# Patient Record
Sex: FEMALE | Race: WHITE | NOT HISPANIC OR LATINO | Employment: FULL TIME | URBAN - METROPOLITAN AREA
[De-identification: names, ages, dates, MRNs, and addresses within clinical notes are randomized per-mention and may not be internally consistent; named-entity substitution may affect disease eponyms.]

---

## 2017-03-14 ENCOUNTER — OFFICE VISIT (OUTPATIENT)
Dept: NEUROSURGERY | Facility: CLINIC | Age: 62
End: 2017-03-14

## 2017-03-14 VITALS
RESPIRATION RATE: 16 BRPM | SYSTOLIC BLOOD PRESSURE: 150 MMHG | BODY MASS INDEX: 35.88 KG/M2 | WEIGHT: 195 LBS | HEART RATE: 80 BPM | DIASTOLIC BLOOD PRESSURE: 98 MMHG | HEIGHT: 62 IN

## 2017-03-14 DIAGNOSIS — T84.216A HARDWARE FAILURE OF ANTERIOR COLUMN OF SPINE (HCC): ICD-10-CM

## 2017-03-14 DIAGNOSIS — M20.039: ICD-10-CM

## 2017-03-14 DIAGNOSIS — M43.10 ANTEROLISTHESIS: ICD-10-CM

## 2017-03-14 DIAGNOSIS — M41.20 SCOLIOSIS (AND KYPHOSCOLIOSIS), IDIOPATHIC: ICD-10-CM

## 2017-03-14 DIAGNOSIS — M54.2 NECK PAIN OF OVER 3 MONTHS DURATION: Primary | ICD-10-CM

## 2017-03-14 PROCEDURE — 99214 OFFICE O/P EST MOD 30 MIN: CPT | Performed by: NURSE PRACTITIONER

## 2017-03-14 NOTE — PROGRESS NOTES
Subjective   Patient ID: Alannah Delgado is a 62 y.o. female accompanied today by a friend (her ). She is here for to schedule surgery for hardware issues in her cervical spine. She has had an appt with plastic surgeon, Dr. Wilson.     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  She is anticipating C6 anterior corpectomy and fusion with removal of posterior cervical hardware and extension of cervical fusion from C3 to T2.  Since her last office visit she has had DEXA scan, evaluation with plastic surgeon, Dr. Wilson. She continues with constant neck pain, but no new issues. She denies any numbness, tingling, weakness.     The following portions of the patient's history were reviewed and updated as appropriate: allergies, current medications, past family history, past medical history, past social history, past surgical history and problem list.    Review of Systems   Constitutional: Negative for chills and fatigue.   HENT: Negative for congestion and postnasal drip.    Respiratory: Negative for shortness of breath.    Cardiovascular: Negative for chest pain.   Gastrointestinal: Negative for constipation.   Genitourinary: Negative for difficulty urinating, frequency and urgency.   Musculoskeletal: Positive for neck pain.   Psychiatric/Behavioral: Negative for sleep disturbance.       Objective      DEXA scan dated November 22, 2016 reports osteoporosis with high risk fracture.  T value -3.2 in the lumbar spine and -2.2 in the left femoral neck    Physical Exam   Constitutional: She is oriented to person, place, and time. She appears well-developed and well-nourished.   HENT:   Head: Normocephalic and atraumatic.   Mouth/Throat: Abnormal dentition. Dental caries present.   Cardiovascular: Normal rate, regular rhythm, normal heart sounds and intact distal pulses.    Pulmonary/Chest: Effort normal and breath sounds normal.   Musculoskeletal:         Cervical back: She exhibits decreased range of motion, tenderness, bony tenderness, deformity (visible closed skin but protruding posterior cervical hardware) and spasm.   Neurological: She is alert and oriented to person, place, and time. She has normal strength. She has a normal Romberg Test. Gait normal.   Reflex Scores:       Tricep reflexes are 3+ on the right side and 2+ on the left side.       Bicep reflexes are 2+ on the right side and 2+ on the left side.       Brachioradialis reflexes are 2+ on the right side and 2+ on the left side.       Patellar reflexes are 2+ on the right side and 2+ on the left side.  Skin: Skin is warm and dry.   Psychiatric: She has a normal mood and affect. Her behavior is normal. Judgment and thought content normal.   Vitals reviewed.    Neurologic Exam     Mental Status   Oriented to person, place, and time.   Level of consciousness: alert    Motor Exam   Muscle bulk: normal  Overall muscle tone: normal    Strength   Strength 5/5 throughout.     Sensory Exam   Right arm light touch: normal  Left arm light touch: normal  Right arm vibration: normal  Left arm vibration: normal       Temperature intact to cold     Gait, Coordination, and Reflexes     Gait  Gait: normal    Coordination   Romberg: negative    Reflexes   Right brachioradialis: 2+  Left brachioradialis: 2+  Right biceps: 2+  Left biceps: 2+  Right triceps: 3+  Left triceps: 2+  Right patellar: 2+  Left patellar: 2+  Right Temple: absent  Left Temple: absent  Right ankle clonus: absent  Left ankle clonus: absent       Able to heel and toe walk       Assessment/Plan   Independent Review of Radiographic Studies:    None new  Medical Decision Making:      I confirmed and obtained the above history as recorded by the nurse practitioner acting as a scribe. I performed the above examination and it is documented by the nurse practitioner acting as a scribe.    Thankfully we have found a plastic surgeon who will work with us  in order to get adequate skin coverage and tissue coverage of the instrumentation needed.    Surgical intervention will require a C6 anterior corpectomy and anterior instrumentation followed by posterior cervical decompression, removal of hardware, and then replacement of hardware from C2 through T3.  I believe that we need to extend the fusion from that previously recommended because of her osteoporosis.  We will also anticipate placement of a halo which we will leave in place for at least 6 weeks.    He has some evidence of dental caries and will require dental work prior to surgical intervention.    The risks, benefits, and alternatives of anterior cervical discectomy with allograft fusion and anterior instrumentation were explained in detail to the patient. The alternative is not to have the operation. The benefit should be, though is not guaranteed, primarily a potential reduction in the neurosensory and/or motor dysfunction; secondarily a possible reduction in neck pain. The risks include, but are not limited to, the possibility of death, infection, bleeding, paralysis, problems with the approach to the cervical spine such as stretching or cutting the laryngeal nerve resulting in vocal chord paralysis, hoarseness, blindness; damage to the carotid artery resulting in stroke; damage to the esophagus resulting in problems with swallowing or painful swallowing; incontinence of urine or stool, sexual dysfunction; meningitis; lack of bony fusion requiring further surgical intervention; osteomyelitis; instrumentation breakdown or pullout; spinal instability; no change or worsening of pain. I also explained the realistic expectations as they pertain to the procedure. The patient voiced understanding of these risks, benefits, alternatives, and realistic expectations and requests that we proceed with operative intervention    I explained the risks benefits and alternatives of posterior cervical fusion with auto and  allograft bone and instrumentation.  Risks include but are not limited to the possibility of cervical spinal cord or nerve root damage resulting in paralysis, weakness, numbness, tingling, dysesthesias etc.  The risks of course include the common risks of any surgical intervention which include the risks of anesthesia, infection, bleeding, need for further surgical intervention, epidural hematoma requiring further surgery immediately, risks of positioning including blindness or pressure sores, urinary tract infection, pulmonary embolus, deep venous thrombosis, etc.  The benefit would be though not guaranteed an improvement in the pain syndrome.  The patient voiced understanding of the risks benefits and the alternatives (nonsurgical management), and requests that we proceed with operative intervention.    I also explained the risks of the halo vest and halo itself including infection of the pin sites, the need to replace pins at times, the requirement to wear the halo at all times, the need for pin site treatment at home including daily cleansing of the pin sites with hydrogen peroxide and then placement of Neosporin or some other antibiotic around the pin sites.    After a complete physical exam, the patient has been informed of the consequences, benefits, appropriate us, and office policies regarding the medication being prescribed. A FIDEL check will be made on-line, and will be repeated if prescription is renewed after a 90 day period. The patient agrees to adhering to the medication regimen as prescribed.    The patient has been advised that we will manage post-operative pain for 1 month. If further narcotic medication is needed beyond that period, a referral back to the primary care physician or to a pain management specialist will be made. If the patient cancels or fails to show for scheduled follow-up visits or the pain management referral,  further narcotic prescriptions from this practice may  cease.      Plan:C6 corpectomy and anterior instrumentation.  Posterior cervical decompression and removal of cervical posterior hardware.  Posterior cervical fusion C2 through T3-instrumented with allograft.  Application of halo and halo vest. Plastic surgical coverage of the posterior instrumenation.    Diagnoses and all orders for this visit:    Neck pain of over 3 months duration    C5 on 6 anterolisthesis  -     Case Request; Standing  -     CBC and Differential; Future  -     Basic metabolic panel; Future  -     Sedimentation rate; Future  -     vancomycin (VANCOCIN) 1,250 mg in sodium chloride 0.9 % 250 mL IVPB; Infuse 1,250 mg into a venous catheter 1 (One) Time.  -     Case Request    Steedman-neck deformity, acquired, unspecified laterality  -     Case Request; Standing  -     CBC and Differential; Future  -     Basic metabolic panel; Future  -     Sedimentation rate; Future  -     vancomycin (VANCOCIN) 1,250 mg in sodium chloride 0.9 % 250 mL IVPB; Infuse 1,250 mg into a venous catheter 1 (One) Time.  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  -     Case Request    Other orders  -     Obtain informed consent  -     AMMY hose- To be placed on patient in pre-op; Standing  -     SCD (sequential compression device)- to be placed on patient in Pre-op; Standing  -     Inpatient Admission; Standing  -     Follow Anesthesia Guidelines / Standing Orders; Standing  -     Verify informed consent; Standing  -     Verify NPO Status; Standing  -     Clorhexidine skin prep  -     Provide instructions to patient on NPO status    Return for Recheck 2 weeks after surgery, Follow up with  nurse practitioner.

## 2017-03-15 DIAGNOSIS — M43.10 ANTEROLISTHESIS: Primary | ICD-10-CM

## 2017-03-19 ENCOUNTER — RESULTS ENCOUNTER (OUTPATIENT)
Dept: NEUROSURGERY | Facility: CLINIC | Age: 62
End: 2017-03-19

## 2017-03-19 DIAGNOSIS — M43.10 ANTEROLISTHESIS: ICD-10-CM

## 2017-03-19 DIAGNOSIS — M20.039: ICD-10-CM

## 2017-03-19 DIAGNOSIS — T84.216A HARDWARE FAILURE OF ANTERIOR COLUMN OF SPINE (HCC): ICD-10-CM

## 2017-04-27 ENCOUNTER — APPOINTMENT (OUTPATIENT)
Dept: PREADMISSION TESTING | Facility: HOSPITAL | Age: 62
End: 2017-04-27

## 2017-04-27 ENCOUNTER — OFFICE VISIT (OUTPATIENT)
Dept: NEUROSURGERY | Facility: CLINIC | Age: 62
End: 2017-04-27

## 2017-04-27 VITALS
SYSTOLIC BLOOD PRESSURE: 133 MMHG | HEART RATE: 89 BPM | DIASTOLIC BLOOD PRESSURE: 80 MMHG | HEIGHT: 63 IN | WEIGHT: 190 LBS | BODY MASS INDEX: 33.66 KG/M2 | OXYGEN SATURATION: 100 % | TEMPERATURE: 96.8 F | RESPIRATION RATE: 16 BRPM

## 2017-04-27 VITALS
DIASTOLIC BLOOD PRESSURE: 92 MMHG | HEART RATE: 72 BPM | RESPIRATION RATE: 16 BRPM | BODY MASS INDEX: 34.38 KG/M2 | SYSTOLIC BLOOD PRESSURE: 140 MMHG | WEIGHT: 191 LBS

## 2017-04-27 DIAGNOSIS — M54.2 NECK PAIN OF OVER 3 MONTHS DURATION: ICD-10-CM

## 2017-04-27 DIAGNOSIS — T84.216A HARDWARE FAILURE OF ANTERIOR COLUMN OF SPINE (HCC): Primary | ICD-10-CM

## 2017-04-27 LAB
ANION GAP SERPL CALCULATED.3IONS-SCNC: 15.6 MMOL/L
BASOPHILS # BLD AUTO: 0.02 10*3/MM3 (ref 0–0.2)
BASOPHILS NFR BLD AUTO: 0.2 % (ref 0–1.5)
BUN BLD-MCNC: 16 MG/DL (ref 8–23)
BUN/CREAT SERPL: 22.2 (ref 7–25)
CALCIUM SPEC-SCNC: 9.6 MG/DL (ref 8.6–10.5)
CHLORIDE SERPL-SCNC: 103 MMOL/L (ref 98–107)
CO2 SERPL-SCNC: 21.4 MMOL/L (ref 22–29)
CREAT BLD-MCNC: 0.72 MG/DL (ref 0.57–1)
DEPRECATED RDW RBC AUTO: 42.7 FL (ref 37–54)
EOSINOPHIL # BLD AUTO: 0.13 10*3/MM3 (ref 0–0.7)
EOSINOPHIL NFR BLD AUTO: 1.5 % (ref 0.3–6.2)
ERYTHROCYTE [DISTWIDTH] IN BLOOD BY AUTOMATED COUNT: 12.5 % (ref 11.7–13)
ERYTHROCYTE [SEDIMENTATION RATE] IN BLOOD: 12 MM/HR (ref 0–30)
GFR SERPL CREATININE-BSD FRML MDRD: 82 ML/MIN/1.73
GLUCOSE BLD-MCNC: 101 MG/DL (ref 65–99)
HCT VFR BLD AUTO: 42.7 % (ref 35.6–45.5)
HGB BLD-MCNC: 14.6 G/DL (ref 11.9–15.5)
IMM GRANULOCYTES # BLD: 0.03 10*3/MM3 (ref 0–0.03)
IMM GRANULOCYTES NFR BLD: 0.3 % (ref 0–0.5)
LYMPHOCYTES # BLD AUTO: 1.59 10*3/MM3 (ref 0.9–4.8)
LYMPHOCYTES NFR BLD AUTO: 18.4 % (ref 19.6–45.3)
MCH RBC QN AUTO: 32.6 PG (ref 26.9–32)
MCHC RBC AUTO-ENTMCNC: 34.2 G/DL (ref 32.4–36.3)
MCV RBC AUTO: 95.3 FL (ref 80.5–98.2)
MONOCYTES # BLD AUTO: 0.55 10*3/MM3 (ref 0.2–1.2)
MONOCYTES NFR BLD AUTO: 6.4 % (ref 5–12)
NEUTROPHILS # BLD AUTO: 6.3 10*3/MM3 (ref 1.9–8.1)
NEUTROPHILS NFR BLD AUTO: 73.2 % (ref 42.7–76)
PLATELET # BLD AUTO: 287 10*3/MM3 (ref 140–500)
PMV BLD AUTO: 10.6 FL (ref 6–12)
POTASSIUM BLD-SCNC: 4 MMOL/L (ref 3.5–5.2)
RBC # BLD AUTO: 4.48 10*6/MM3 (ref 3.9–5.2)
SODIUM BLD-SCNC: 140 MMOL/L (ref 136–145)
WBC NRBC COR # BLD: 8.62 10*3/MM3 (ref 4.5–10.7)

## 2017-04-27 PROCEDURE — 85652 RBC SED RATE AUTOMATED: CPT | Performed by: NEUROLOGICAL SURGERY

## 2017-04-27 PROCEDURE — 99213 OFFICE O/P EST LOW 20 MIN: CPT | Performed by: NURSE PRACTITIONER

## 2017-04-27 PROCEDURE — 93010 ELECTROCARDIOGRAM REPORT: CPT | Performed by: INTERNAL MEDICINE

## 2017-04-27 PROCEDURE — 36415 COLL VENOUS BLD VENIPUNCTURE: CPT | Performed by: NEUROLOGICAL SURGERY

## 2017-04-27 PROCEDURE — 85025 COMPLETE CBC W/AUTO DIFF WBC: CPT | Performed by: NEUROLOGICAL SURGERY

## 2017-04-27 PROCEDURE — 80048 BASIC METABOLIC PNL TOTAL CA: CPT | Performed by: NEUROLOGICAL SURGERY

## 2017-04-27 PROCEDURE — 93005 ELECTROCARDIOGRAM TRACING: CPT

## 2017-04-27 NOTE — PROGRESS NOTES
Subjective   Patient ID: Alannah Delgado is a 62 y.o. female is here today for follow-up prior to removal of cervical surgical hardware. She is accompanied by a friend, Rachell    History of Present Illness   Patient resents for ongoing follow-up of cervical hardware failure.  She is anticipating surgery with C6 corpectomy and anterior instrumentation. Posterior cervical decompression and removal of cervical posterior hardware. Posterior cervical fusion C2 through T3-instrumented with allograft. Application of halo and halo vest. Plastic surgical coverage of the posterior instrumenation. She continues with neck pain. She has no arm pain, numbness, or weakness.     The following portions of the patient's history were reviewed and updated as appropriate: allergies, current medications, past family history, past medical history, past social history, past surgical history and problem list.    Review of Systems   Constitutional: Negative for fever.   HENT: Negative for trouble swallowing.    Respiratory: Negative for cough and wheezing.    Cardiovascular: Negative for chest pain.   Gastrointestinal: Negative for abdominal pain and constipation.   Genitourinary: Negative for difficulty urinating and enuresis.   Musculoskeletal: Positive for neck pain. Negative for gait problem.   Neurological: Negative for weakness and numbness.       Objective      Results from last 7 days  Lab Units 04/27/17  0904   WBC 10*3/mm3 8.62   HEMOGLOBIN g/dL 14.6   HEMATOCRIT % 42.7   PLATELETS 10*3/mm3 287       Results from last 7 days  Lab Units 04/27/17  0904   SODIUM mmol/L 140   POTASSIUM mmol/L 4.0   CHLORIDE mmol/L 103   TOTAL CO2 mmol/L 21.4*   BUN mg/dL 16   CREATININE mg/dL 0.72   CALCIUM mg/dL 9.6   GLUCOSE mg/dL 101*         Results from last 7 days  Lab Units 04/27/17  0937   SED RATE mm/hr 12     EKG- SR  Physical Exam   Constitutional: She is oriented to person, place, and time. She appears well-developed and well-nourished. She  is cooperative.   HENT:   Head: Normocephalic and atraumatic.   Eyes: No scleral icterus.   Neck: Neck supple.   Cardiovascular: Normal rate, regular rhythm and intact distal pulses.    No murmur heard.  Pulmonary/Chest: Effort normal and breath sounds normal.   Abdominal: Soft. Bowel sounds are normal. There is no tenderness.   Musculoskeletal:        Cervical back: She exhibits decreased range of motion, tenderness, bony tenderness and pain (with rotation).   Neurological: She is alert and oriented to person, place, and time. She has normal strength. She displays no atrophy. She exhibits normal muscle tone. She displays a negative Romberg sign. Coordination and gait normal. GCS eye subscore is 4. GCS verbal subscore is 5. GCS motor subscore is 6.   Reflex Scores:       Tricep reflexes are 3+ on the right side and 3+ on the left side.       Bicep reflexes are 3+ on the right side and 3+ on the left side.       Brachioradialis reflexes are 3+ on the right side and 3+ on the left side.       Patellar reflexes are 2+ on the right side and 2+ on the left side.  Negative Temple    Skin: Skin is warm, dry and intact.   Well healed posterior cervical incision. Palpable hardware but no skin breakdown.   Psychiatric: She has a normal mood and affect. Her speech is normal and behavior is normal. Judgment and thought content normal. Cognition and memory are normal.   Vitals reviewed.    Neurologic Exam     Mental Status   Oriented to person, place, and time.   Speech: speech is normal     Motor Exam     Strength   Strength 5/5 throughout.     Sensory Exam   Light touch normal.     Gait, Coordination, and Reflexes     Reflexes   Right brachioradialis: 3+  Left brachioradialis: 3+  Right biceps: 3+  Left biceps: 3+  Right triceps: 3+  Left triceps: 3+  Right patellar: 2+  Left patellar: 2+      Assessment/Plan   Independent Review of Radiographic Studies:    No new imaging    Medical Decision Making:    Patient resents for  follow-up prior to undergoing extensive cervical surgery for hardware failure.  She denies any new problems.  She continues to have significant neck pain.    Her exam is as noted above with no neurologic red flags.  Rbas were discussed in detail regarding the surgery at the last office visit with Dr. Guidry.  I answered multiple questions today with regards to postoperative expectations including rehabilitation and restrictions with a halo brace.    Plan: Cervical surgery as planned and return to office following.    Alannah was seen today for cervical hardware failure.    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

## 2017-05-03 ENCOUNTER — ANESTHESIA EVENT (OUTPATIENT)
Dept: PERIOP | Facility: HOSPITAL | Age: 62
End: 2017-05-03

## 2017-05-03 ENCOUNTER — APPOINTMENT (OUTPATIENT)
Dept: GENERAL RADIOLOGY | Facility: HOSPITAL | Age: 62
End: 2017-05-03

## 2017-05-03 ENCOUNTER — ANESTHESIA (OUTPATIENT)
Dept: PERIOP | Facility: HOSPITAL | Age: 62
End: 2017-05-03

## 2017-05-03 ENCOUNTER — HOSPITAL ENCOUNTER (INPATIENT)
Facility: HOSPITAL | Age: 62
LOS: 7 days | End: 2017-05-10
Attending: NEUROLOGICAL SURGERY | Admitting: NEUROLOGICAL SURGERY

## 2017-05-03 DIAGNOSIS — M20.039: ICD-10-CM

## 2017-05-03 DIAGNOSIS — M43.10 ANTEROLISTHESIS: ICD-10-CM

## 2017-05-03 DIAGNOSIS — R26.2 DIFFICULTY WALKING: ICD-10-CM

## 2017-05-03 DIAGNOSIS — M54.2 NECK PAIN OF OVER 3 MONTHS DURATION: Primary | ICD-10-CM

## 2017-05-03 DIAGNOSIS — T84.216A HARDWARE FAILURE OF ANTERIOR COLUMN OF SPINE (HCC): ICD-10-CM

## 2017-05-03 LAB — GLUCOSE BLDC GLUCOMTR-MCNC: 188 MG/DL (ref 70–130)

## 2017-05-03 PROCEDURE — C1713 ANCHOR/SCREW BN/BN,TIS/BN: HCPCS | Performed by: NEUROLOGICAL SURGERY

## 2017-05-03 PROCEDURE — 25010000002 HYDROMORPHONE PER 4 MG: Performed by: NURSE ANESTHETIST, CERTIFIED REGISTERED

## 2017-05-03 PROCEDURE — 20936 SP BONE AGRFT LOCAL ADD-ON: CPT | Performed by: NEUROLOGICAL SURGERY

## 2017-05-03 PROCEDURE — 22554 ARTHRD ANT NTRBD MIN DSC CRV: CPT | Performed by: NEUROLOGICAL SURGERY

## 2017-05-03 PROCEDURE — 25010000002 FENTANYL CITRATE (PF) 100 MCG/2ML SOLUTION: Performed by: NURSE ANESTHETIST, CERTIFIED REGISTERED

## 2017-05-03 PROCEDURE — 25010000002 MIDAZOLAM PER 1 MG: Performed by: ANESTHESIOLOGY

## 2017-05-03 PROCEDURE — 25010000002 PHENYLEPHRINE PER 1 ML: Performed by: NURSE ANESTHETIST, CERTIFIED REGISTERED

## 2017-05-03 PROCEDURE — 22846 INSERT SPINE FIXATION DEVICE: CPT | Performed by: NEUROLOGICAL SURGERY

## 2017-05-03 PROCEDURE — 22585 ARTHRD ANT NTRBD MIN DSC EA: CPT | Performed by: SPECIALIST/TECHNOLOGIST, OTHER

## 2017-05-03 PROCEDURE — 85014 HEMATOCRIT: CPT

## 2017-05-03 PROCEDURE — 22846 INSERT SPINE FIXATION DEVICE: CPT | Performed by: SPECIALIST/TECHNOLOGIST, OTHER

## 2017-05-03 PROCEDURE — 82947 ASSAY GLUCOSE BLOOD QUANT: CPT

## 2017-05-03 PROCEDURE — 22600 ARTHRD PST TQ 1NTRSPC CRV: CPT | Performed by: SPECIALIST/TECHNOLOGIST, OTHER

## 2017-05-03 PROCEDURE — 22843 INSERT SPINE FIXATION DEVICE: CPT | Performed by: SPECIALIST/TECHNOLOGIST, OTHER

## 2017-05-03 PROCEDURE — 20661 APPLICATION HALO CRANIAL: CPT | Performed by: NEUROLOGICAL SURGERY

## 2017-05-03 PROCEDURE — 25010000002 DEXAMETHASONE PER 1 MG: Performed by: NURSE ANESTHETIST, CERTIFIED REGISTERED

## 2017-05-03 PROCEDURE — 2W60X0Z TRACTION OF HEAD USING TRACTION APPARATUS: ICD-10-PCS | Performed by: NEUROLOGICAL SURGERY

## 2017-05-03 PROCEDURE — 72050 X-RAY EXAM NECK SPINE 4/5VWS: CPT

## 2017-05-03 PROCEDURE — 22585 ARTHRD ANT NTRBD MIN DSC EA: CPT | Performed by: NEUROLOGICAL SURGERY

## 2017-05-03 PROCEDURE — 25010000002 VANCOMYCIN PER 500 MG: Performed by: NEUROLOGICAL SURGERY

## 2017-05-03 PROCEDURE — 22554 ARTHRD ANT NTRBD MIN DSC CRV: CPT | Performed by: SPECIALIST/TECHNOLOGIST, OTHER

## 2017-05-03 PROCEDURE — 63081 REMOVE VERT BODY DCMPRN CRVL: CPT | Performed by: SPECIALIST/TECHNOLOGIST, OTHER

## 2017-05-03 PROCEDURE — 85018 HEMOGLOBIN: CPT

## 2017-05-03 PROCEDURE — 22614 ARTHRD PST TQ 1NTRSPC EA ADD: CPT | Performed by: SPECIALIST/TECHNOLOGIST, OTHER

## 2017-05-03 PROCEDURE — 25010000002 FENTANYL CITRATE (PF) 100 MCG/2ML SOLUTION: Performed by: ANESTHESIOLOGY

## 2017-05-03 PROCEDURE — 0RG20A0 FUSION OF 2 OR MORE CERVICAL VERTEBRAL JOINTS WITH INTERBODY FUSION DEVICE, ANTERIOR APPROACH, ANTERIOR COLUMN, OPEN APPROACH: ICD-10-PCS | Performed by: NEUROLOGICAL SURGERY

## 2017-05-03 PROCEDURE — 0HX6XZZ TRANSFER BACK SKIN, EXTERNAL APPROACH: ICD-10-PCS | Performed by: SPECIALIST

## 2017-05-03 PROCEDURE — 0RG40Z1: ICD-10-PCS | Performed by: NEUROLOGICAL SURGERY

## 2017-05-03 PROCEDURE — 22843 INSERT SPINE FIXATION DEVICE: CPT | Performed by: NEUROLOGICAL SURGERY

## 2017-05-03 PROCEDURE — 76000 FLUOROSCOPY <1 HR PHYS/QHP: CPT

## 2017-05-03 PROCEDURE — 25010000002 VANCOMYCIN: Performed by: NEUROLOGICAL SURGERY

## 2017-05-03 PROCEDURE — 25010000002 MIDAZOLAM PER 1 MG: Performed by: NURSE ANESTHETIST, CERTIFIED REGISTERED

## 2017-05-03 PROCEDURE — 25010000002 PROMETHAZINE PER 50 MG: Performed by: NURSE ANESTHETIST, CERTIFIED REGISTERED

## 2017-05-03 PROCEDURE — L0174 CERV SR 2PC THOR EXT PRE OTS: HCPCS | Performed by: NEUROLOGICAL SURGERY

## 2017-05-03 PROCEDURE — 63081 REMOVE VERT BODY DCMPRN CRVL: CPT | Performed by: NEUROLOGICAL SURGERY

## 2017-05-03 PROCEDURE — 22614 ARTHRD PST TQ 1NTRSPC EA ADD: CPT | Performed by: NEUROLOGICAL SURGERY

## 2017-05-03 PROCEDURE — 82962 GLUCOSE BLOOD TEST: CPT

## 2017-05-03 PROCEDURE — 82803 BLOOD GASES ANY COMBINATION: CPT

## 2017-05-03 PROCEDURE — 22854 INSJ BIOMECHANICAL DEVICE: CPT | Performed by: SPECIALIST/TECHNOLOGIST, OTHER

## 2017-05-03 PROCEDURE — 22854 INSJ BIOMECHANICAL DEVICE: CPT | Performed by: NEUROLOGICAL SURGERY

## 2017-05-03 PROCEDURE — 25010000002 ONDANSETRON PER 1 MG: Performed by: NURSE ANESTHETIST, CERTIFIED REGISTERED

## 2017-05-03 PROCEDURE — 22600 ARTHRD PST TQ 1NTRSPC CRV: CPT | Performed by: NEUROLOGICAL SURGERY

## 2017-05-03 PROCEDURE — 25010000002 PROMETHAZINE PER 50 MG

## 2017-05-03 PROCEDURE — 0PP304Z REMOVAL OF INTERNAL FIXATION DEVICE FROM CERVICAL VERTEBRA, OPEN APPROACH: ICD-10-PCS | Performed by: NEUROLOGICAL SURGERY

## 2017-05-03 PROCEDURE — 25010000002 PROPOFOL 10 MG/ML EMULSION: Performed by: NURSE ANESTHETIST, CERTIFIED REGISTERED

## 2017-05-03 PROCEDURE — 25010000002 SUCCINYLCHOLINE PER 20 MG: Performed by: NURSE ANESTHETIST, CERTIFIED REGISTERED

## 2017-05-03 DEVICE — ROD 7750015 3.5MM ROD 240MM
Type: IMPLANTABLE DEVICE | Status: FUNCTIONAL
Brand: VERTEX® RECONSTRUCTION SYSTEM

## 2017-05-03 DEVICE — SCREW 6958726 3.5 X 26MM MAS
Type: IMPLANTABLE DEVICE | Status: FUNCTIONAL
Brand: VERTEX® RECONSTRUCTION SYSTEM

## 2017-05-03 DEVICE — SCREW 3120515 4.0 X 15 SELF DRILL VAR
Type: IMPLANTABLE DEVICE | Site: SPINE CERVICAL | Status: FUNCTIONAL
Brand: ATLANTIS® ANTERIOR CERVICAL PLATE SYSTEM

## 2017-05-03 DEVICE — IMPLANT 6240864 ANATOMIC 16X14X8MM
Type: IMPLANTABLE DEVICE | Site: SPINE CERVICAL | Status: FUNCTIONAL
Brand: VERTE-STACK® SPINAL SYSTEM

## 2017-05-03 DEVICE — SCREW 6958718 3.5 X 18MM MAS
Type: IMPLANTABLE DEVICE | Status: FUNCTIONAL
Brand: VERTEX® RECONSTRUCTION SYSTEM

## 2017-05-03 DEVICE — SCREW 6958720 3.5 X 20MM MAS
Type: IMPLANTABLE DEVICE | Status: FUNCTIONAL
Brand: VERTEX® RECONSTRUCTION SYSTEM

## 2017-05-03 DEVICE — SCREW 6958930 4.5 X 30MM MAS
Type: IMPLANTABLE DEVICE | Status: FUNCTIONAL
Brand: VERTEX® RECONSTRUCTION SYSTEM

## 2017-05-03 DEVICE — SET SCREW 6950315 M6 SET SCREW
Type: IMPLANTABLE DEVICE | Status: FUNCTIONAL
Brand: VERTEX® RECONSTRUCTION SYSTEM

## 2017-05-03 DEVICE — SCREW 6958724 3.5 X 24MM MAS
Type: IMPLANTABLE DEVICE | Status: FUNCTIONAL
Brand: VERTEX® RECONSTRUCTION SYSTEM

## 2017-05-03 DEVICE — CROSSLINK 7752536 MEDIUM ROD
Type: IMPLANTABLE DEVICE | Status: FUNCTIONAL
Brand: VERTEX® RECONSTRUCTION SYSTEM

## 2017-05-03 DEVICE — STRIP 7800320 MASTERGRAFT STRIP 20CM
Type: IMPLANTABLE DEVICE | Status: FUNCTIONAL
Brand: MASTERGRAFT® STRIP

## 2017-05-03 DEVICE — SCREW 6958934 4.5 X 34MM MAS
Type: IMPLANTABLE DEVICE | Status: FUNCTIONAL
Brand: VERTEX® RECONSTRUCTION SYSTEM

## 2017-05-03 DEVICE — SCREW 6958834 4.0 X 34MM MAS
Type: IMPLANTABLE DEVICE | Status: FUNCTIONAL
Brand: VERTEX® RECONSTRUCTION SYSTEM

## 2017-05-03 DEVICE — LATERAL CONNECTOR 7756064 OPEN 10MM
Type: IMPLANTABLE DEVICE | Status: FUNCTIONAL
Brand: VERTEX® RECONSTRUCTION SYSTEM

## 2017-05-03 RX ORDER — CLINDAMYCIN PHOSPHATE 600 MG/50ML
600 INJECTION INTRAVENOUS EVERY 8 HOURS
Status: COMPLETED | OUTPATIENT
Start: 2017-05-04 | End: 2017-05-04

## 2017-05-03 RX ORDER — NALOXONE HCL 0.4 MG/ML
0.4 VIAL (ML) INJECTION
Status: DISCONTINUED | OUTPATIENT
Start: 2017-05-03 | End: 2017-05-04

## 2017-05-03 RX ORDER — PROMETHAZINE HYDROCHLORIDE 25 MG/ML
INJECTION, SOLUTION INTRAMUSCULAR; INTRAVENOUS
Status: COMPLETED
Start: 2017-05-03 | End: 2017-05-03

## 2017-05-03 RX ORDER — ROCURONIUM BROMIDE 10 MG/ML
INJECTION, SOLUTION INTRAVENOUS AS NEEDED
Status: DISCONTINUED | OUTPATIENT
Start: 2017-05-03 | End: 2017-05-03 | Stop reason: SURG

## 2017-05-03 RX ORDER — NALOXONE HCL 0.4 MG/ML
0.2 VIAL (ML) INJECTION AS NEEDED
Status: DISCONTINUED | OUTPATIENT
Start: 2017-05-03 | End: 2017-05-03 | Stop reason: HOSPADM

## 2017-05-03 RX ORDER — SUCCINYLCHOLINE CHLORIDE 20 MG/ML
INJECTION INTRAMUSCULAR; INTRAVENOUS AS NEEDED
Status: DISCONTINUED | OUTPATIENT
Start: 2017-05-03 | End: 2017-05-03 | Stop reason: SURG

## 2017-05-03 RX ORDER — MIDAZOLAM HYDROCHLORIDE 1 MG/ML
1 INJECTION INTRAMUSCULAR; INTRAVENOUS
Status: DISCONTINUED | OUTPATIENT
Start: 2017-05-03 | End: 2017-05-03 | Stop reason: HOSPADM

## 2017-05-03 RX ORDER — ONDANSETRON 4 MG/1
4 TABLET, FILM COATED ORAL EVERY 6 HOURS PRN
Status: DISCONTINUED | OUTPATIENT
Start: 2017-05-03 | End: 2017-05-10 | Stop reason: HOSPADM

## 2017-05-03 RX ORDER — FENTANYL CITRATE 50 UG/ML
50 INJECTION, SOLUTION INTRAMUSCULAR; INTRAVENOUS
Status: DISCONTINUED | OUTPATIENT
Start: 2017-05-03 | End: 2017-05-03 | Stop reason: HOSPADM

## 2017-05-03 RX ORDER — LABETALOL HYDROCHLORIDE 5 MG/ML
5 INJECTION, SOLUTION INTRAVENOUS
Status: DISCONTINUED | OUTPATIENT
Start: 2017-05-03 | End: 2017-05-03 | Stop reason: HOSPADM

## 2017-05-03 RX ORDER — CLINDAMYCIN PHOSPHATE 900 MG/50ML
INJECTION INTRAVENOUS AS NEEDED
Status: DISCONTINUED | OUTPATIENT
Start: 2017-05-03 | End: 2017-05-03 | Stop reason: SURG

## 2017-05-03 RX ORDER — DOCUSATE SODIUM 100 MG/1
100 CAPSULE, LIQUID FILLED ORAL 2 TIMES DAILY PRN
Status: DISCONTINUED | OUTPATIENT
Start: 2017-05-03 | End: 2017-05-04

## 2017-05-03 RX ORDER — HYDROMORPHONE HYDROCHLORIDE 1 MG/ML
0.5 INJECTION, SOLUTION INTRAMUSCULAR; INTRAVENOUS; SUBCUTANEOUS
Status: DISCONTINUED | OUTPATIENT
Start: 2017-05-03 | End: 2017-05-03 | Stop reason: HOSPADM

## 2017-05-03 RX ORDER — FAMOTIDINE 10 MG/ML
20 INJECTION, SOLUTION INTRAVENOUS ONCE
Status: DISCONTINUED | OUTPATIENT
Start: 2017-05-03 | End: 2017-05-03 | Stop reason: HOSPADM

## 2017-05-03 RX ORDER — SODIUM CHLORIDE 0.9 % (FLUSH) 0.9 %
1-10 SYRINGE (ML) INJECTION AS NEEDED
Status: DISCONTINUED | OUTPATIENT
Start: 2017-05-03 | End: 2017-05-03 | Stop reason: HOSPADM

## 2017-05-03 RX ORDER — SODIUM CHLORIDE, SODIUM LACTATE, POTASSIUM CHLORIDE, CALCIUM CHLORIDE 600; 310; 30; 20 MG/100ML; MG/100ML; MG/100ML; MG/100ML
75 INJECTION, SOLUTION INTRAVENOUS CONTINUOUS
Status: DISCONTINUED | OUTPATIENT
Start: 2017-05-03 | End: 2017-05-06

## 2017-05-03 RX ORDER — MORPHINE SULFATE 10 MG/ML
6 INJECTION INTRAMUSCULAR; INTRAVENOUS; SUBCUTANEOUS
Status: DISCONTINUED | OUTPATIENT
Start: 2017-05-03 | End: 2017-05-04

## 2017-05-03 RX ORDER — OXYCODONE AND ACETAMINOPHEN 7.5; 325 MG/1; MG/1
1 TABLET ORAL ONCE AS NEEDED
Status: DISCONTINUED | OUTPATIENT
Start: 2017-05-03 | End: 2017-05-03 | Stop reason: HOSPADM

## 2017-05-03 RX ORDER — ONDANSETRON 4 MG/1
4 TABLET, ORALLY DISINTEGRATING ORAL EVERY 6 HOURS PRN
Status: DISCONTINUED | OUTPATIENT
Start: 2017-05-03 | End: 2017-05-10 | Stop reason: HOSPADM

## 2017-05-03 RX ORDER — NALOXONE HCL 0.4 MG/ML
0.1 VIAL (ML) INJECTION
Status: DISCONTINUED | OUTPATIENT
Start: 2017-05-03 | End: 2017-05-03

## 2017-05-03 RX ORDER — DIPHENHYDRAMINE HYDROCHLORIDE 50 MG/ML
12.5 INJECTION INTRAMUSCULAR; INTRAVENOUS
Status: DISCONTINUED | OUTPATIENT
Start: 2017-05-03 | End: 2017-05-03 | Stop reason: HOSPADM

## 2017-05-03 RX ORDER — SODIUM CHLORIDE, SODIUM LACTATE, POTASSIUM CHLORIDE, CALCIUM CHLORIDE 600; 310; 30; 20 MG/100ML; MG/100ML; MG/100ML; MG/100ML
INJECTION, SOLUTION INTRAVENOUS CONTINUOUS PRN
Status: DISCONTINUED | OUTPATIENT
Start: 2017-05-03 | End: 2017-05-03 | Stop reason: SURG

## 2017-05-03 RX ORDER — HYDROCODONE BITARTRATE AND ACETAMINOPHEN 7.5; 325 MG/1; MG/1
1 TABLET ORAL ONCE AS NEEDED
Status: DISCONTINUED | OUTPATIENT
Start: 2017-05-03 | End: 2017-05-03 | Stop reason: HOSPADM

## 2017-05-03 RX ORDER — PROMETHAZINE HYDROCHLORIDE 25 MG/ML
12.5 INJECTION, SOLUTION INTRAMUSCULAR; INTRAVENOUS ONCE AS NEEDED
Status: COMPLETED | OUTPATIENT
Start: 2017-05-03 | End: 2017-05-03

## 2017-05-03 RX ORDER — ACETAMINOPHEN 325 MG/1
650 TABLET ORAL EVERY 4 HOURS PRN
Status: DISCONTINUED | OUTPATIENT
Start: 2017-05-03 | End: 2017-05-10 | Stop reason: HOSPADM

## 2017-05-03 RX ORDER — LIDOCAINE HYDROCHLORIDE 20 MG/ML
INJECTION, SOLUTION INFILTRATION; PERINEURAL AS NEEDED
Status: DISCONTINUED | OUTPATIENT
Start: 2017-05-03 | End: 2017-05-03 | Stop reason: SURG

## 2017-05-03 RX ORDER — PROMETHAZINE HYDROCHLORIDE 25 MG/1
25 TABLET ORAL ONCE AS NEEDED
Status: COMPLETED | OUTPATIENT
Start: 2017-05-03 | End: 2017-05-03

## 2017-05-03 RX ORDER — HYDROMORPHONE HCL 110MG/55ML
PATIENT CONTROLLED ANALGESIA SYRINGE INTRAVENOUS AS NEEDED
Status: DISCONTINUED | OUTPATIENT
Start: 2017-05-03 | End: 2017-05-03 | Stop reason: SURG

## 2017-05-03 RX ORDER — PROMETHAZINE HYDROCHLORIDE 25 MG/1
25 SUPPOSITORY RECTAL ONCE AS NEEDED
Status: COMPLETED | OUTPATIENT
Start: 2017-05-03 | End: 2017-05-03

## 2017-05-03 RX ORDER — PROMETHAZINE HYDROCHLORIDE 25 MG/1
12.5 TABLET ORAL ONCE AS NEEDED
Status: DISCONTINUED | OUTPATIENT
Start: 2017-05-03 | End: 2017-05-03 | Stop reason: HOSPADM

## 2017-05-03 RX ORDER — ONDANSETRON 2 MG/ML
4 INJECTION INTRAMUSCULAR; INTRAVENOUS ONCE AS NEEDED
Status: COMPLETED | OUTPATIENT
Start: 2017-05-03 | End: 2017-05-03

## 2017-05-03 RX ORDER — HYDRALAZINE HYDROCHLORIDE 20 MG/ML
5 INJECTION INTRAMUSCULAR; INTRAVENOUS
Status: DISCONTINUED | OUTPATIENT
Start: 2017-05-03 | End: 2017-05-03 | Stop reason: HOSPADM

## 2017-05-03 RX ORDER — SODIUM CHLORIDE, SODIUM LACTATE, POTASSIUM CHLORIDE, CALCIUM CHLORIDE 600; 310; 30; 20 MG/100ML; MG/100ML; MG/100ML; MG/100ML
9 INJECTION, SOLUTION INTRAVENOUS CONTINUOUS
Status: DISCONTINUED | OUTPATIENT
Start: 2017-05-03 | End: 2017-05-04

## 2017-05-03 RX ORDER — DIPHENHYDRAMINE HCL 25 MG
25 CAPSULE ORAL EVERY 6 HOURS PRN
Status: CANCELLED | OUTPATIENT
Start: 2017-05-03

## 2017-05-03 RX ORDER — HYDROCODONE BITARTRATE AND ACETAMINOPHEN 5; 325 MG/1; MG/1
2 TABLET ORAL EVERY 4 HOURS PRN
Status: DISCONTINUED | OUTPATIENT
Start: 2017-05-03 | End: 2017-05-04

## 2017-05-03 RX ORDER — HYDROMORPHONE HCL IN 0.9% NACL 10 MG/50ML
PATIENT CONTROLLED ANALGESIA SYRINGE INTRAVENOUS CONTINUOUS
Status: DISCONTINUED | OUTPATIENT
Start: 2017-05-03 | End: 2017-05-04

## 2017-05-03 RX ORDER — LIDOCAINE HYDROCHLORIDE 40 MG/ML
SOLUTION TOPICAL AS NEEDED
Status: DISCONTINUED | OUTPATIENT
Start: 2017-05-03 | End: 2017-05-03 | Stop reason: SURG

## 2017-05-03 RX ORDER — MIDAZOLAM HYDROCHLORIDE 1 MG/ML
2 INJECTION INTRAMUSCULAR; INTRAVENOUS
Status: DISCONTINUED | OUTPATIENT
Start: 2017-05-03 | End: 2017-05-03 | Stop reason: HOSPADM

## 2017-05-03 RX ORDER — ONDANSETRON 2 MG/ML
4 INJECTION INTRAMUSCULAR; INTRAVENOUS EVERY 6 HOURS PRN
Status: DISCONTINUED | OUTPATIENT
Start: 2017-05-03 | End: 2017-05-10 | Stop reason: HOSPADM

## 2017-05-03 RX ORDER — MIDAZOLAM HYDROCHLORIDE 1 MG/ML
INJECTION INTRAMUSCULAR; INTRAVENOUS AS NEEDED
Status: DISCONTINUED | OUTPATIENT
Start: 2017-05-03 | End: 2017-05-03 | Stop reason: SURG

## 2017-05-03 RX ORDER — FLUMAZENIL 0.1 MG/ML
0.2 INJECTION INTRAVENOUS AS NEEDED
Status: DISCONTINUED | OUTPATIENT
Start: 2017-05-03 | End: 2017-05-03 | Stop reason: HOSPADM

## 2017-05-03 RX ORDER — FAMOTIDINE 10 MG/ML
20 INJECTION, SOLUTION INTRAVENOUS ONCE
Status: COMPLETED | OUTPATIENT
Start: 2017-05-03 | End: 2017-05-03

## 2017-05-03 RX ORDER — NALOXONE HCL 0.4 MG/ML
0.1 VIAL (ML) INJECTION
Status: DISCONTINUED | OUTPATIENT
Start: 2017-05-03 | End: 2017-05-04

## 2017-05-03 RX ORDER — SODIUM CHLORIDE, SODIUM LACTATE, POTASSIUM CHLORIDE, CALCIUM CHLORIDE 600; 310; 30; 20 MG/100ML; MG/100ML; MG/100ML; MG/100ML
9 INJECTION, SOLUTION INTRAVENOUS CONTINUOUS
Status: DISCONTINUED | OUTPATIENT
Start: 2017-05-03 | End: 2017-05-03

## 2017-05-03 RX ORDER — BISACODYL 5 MG/1
10 TABLET, DELAYED RELEASE ORAL DAILY PRN
Status: DISCONTINUED | OUTPATIENT
Start: 2017-05-03 | End: 2017-05-10 | Stop reason: HOSPADM

## 2017-05-03 RX ORDER — DIPHENHYDRAMINE HYDROCHLORIDE 50 MG/ML
25 INJECTION INTRAMUSCULAR; INTRAVENOUS EVERY 6 HOURS PRN
Status: DISCONTINUED | OUTPATIENT
Start: 2017-05-03 | End: 2017-05-10 | Stop reason: HOSPADM

## 2017-05-03 RX ORDER — DEXAMETHASONE SODIUM PHOSPHATE 10 MG/ML
INJECTION INTRAMUSCULAR; INTRAVENOUS AS NEEDED
Status: DISCONTINUED | OUTPATIENT
Start: 2017-05-03 | End: 2017-05-03 | Stop reason: SURG

## 2017-05-03 RX ORDER — MORPHINE SULFATE IN 0.9 % NACL 50 MG/50ML
PATIENT CONTROLLED ANALGESIA SYRINGE INTRAVENOUS CONTINUOUS
Status: DISCONTINUED | OUTPATIENT
Start: 2017-05-03 | End: 2017-05-03

## 2017-05-03 RX ORDER — PROPOFOL 10 MG/ML
VIAL (ML) INTRAVENOUS AS NEEDED
Status: DISCONTINUED | OUTPATIENT
Start: 2017-05-03 | End: 2017-05-03 | Stop reason: SURG

## 2017-05-03 RX ORDER — FENTANYL CITRATE 50 UG/ML
INJECTION, SOLUTION INTRAMUSCULAR; INTRAVENOUS AS NEEDED
Status: DISCONTINUED | OUTPATIENT
Start: 2017-05-03 | End: 2017-05-03 | Stop reason: SURG

## 2017-05-03 RX ADMIN — DEXAMETHASONE SODIUM PHOSPHATE 8 MG: 10 INJECTION INTRAMUSCULAR; INTRAVENOUS at 09:09

## 2017-05-03 RX ADMIN — SODIUM CHLORIDE, POTASSIUM CHLORIDE, SODIUM LACTATE AND CALCIUM CHLORIDE: 600; 310; 30; 20 INJECTION, SOLUTION INTRAVENOUS at 10:02

## 2017-05-03 RX ADMIN — HYDROMORPHONE HYDROCHLORIDE 0.5 MG: 1 INJECTION, SOLUTION INTRAMUSCULAR; INTRAVENOUS; SUBCUTANEOUS at 19:34

## 2017-05-03 RX ADMIN — PHENYLEPHRINE HYDROCHLORIDE 100 MCG: 10 INJECTION INTRAVENOUS at 08:41

## 2017-05-03 RX ADMIN — PHENYLEPHRINE HYDROCHLORIDE 100 MCG: 10 INJECTION INTRAVENOUS at 13:00

## 2017-05-03 RX ADMIN — PHENYLEPHRINE HYDROCHLORIDE 100 MCG: 10 INJECTION INTRAVENOUS at 11:23

## 2017-05-03 RX ADMIN — FENTANYL CITRATE 50 MCG: 50 INJECTION INTRAMUSCULAR; INTRAVENOUS at 10:19

## 2017-05-03 RX ADMIN — FENTANYL CITRATE 50 MCG: 50 INJECTION INTRAMUSCULAR; INTRAVENOUS at 07:45

## 2017-05-03 RX ADMIN — SODIUM CHLORIDE, POTASSIUM CHLORIDE, SODIUM LACTATE AND CALCIUM CHLORIDE: 600; 310; 30; 20 INJECTION, SOLUTION INTRAVENOUS at 08:07

## 2017-05-03 RX ADMIN — PHENYLEPHRINE HYDROCHLORIDE 100 MCG: 10 INJECTION INTRAVENOUS at 08:47

## 2017-05-03 RX ADMIN — FENTANYL CITRATE 50 MCG: 50 INJECTION INTRAMUSCULAR; INTRAVENOUS at 09:17

## 2017-05-03 RX ADMIN — PHENYLEPHRINE HYDROCHLORIDE 100 MCG: 10 INJECTION INTRAVENOUS at 11:41

## 2017-05-03 RX ADMIN — ONDANSETRON 4 MG: 2 INJECTION INTRAMUSCULAR; INTRAVENOUS at 19:07

## 2017-05-03 RX ADMIN — PROMETHAZINE HYDROCHLORIDE 12.5 MG: 25 INJECTION, SOLUTION INTRAMUSCULAR; INTRAVENOUS at 19:45

## 2017-05-03 RX ADMIN — PHENYLEPHRINE HYDROCHLORIDE 100 MCG: 10 INJECTION INTRAVENOUS at 11:16

## 2017-05-03 RX ADMIN — EPHEDRINE SULFATE 10 MG: 50 INJECTION INTRAMUSCULAR; INTRAVENOUS; SUBCUTANEOUS at 11:50

## 2017-05-03 RX ADMIN — HYDROMORPHONE HYDROCHLORIDE 0.5 MG: 2 INJECTION, SOLUTION INTRAMUSCULAR; INTRAVENOUS; SUBCUTANEOUS at 10:23

## 2017-05-03 RX ADMIN — FENTANYL CITRATE 50 MCG: 50 INJECTION INTRAMUSCULAR; INTRAVENOUS at 09:22

## 2017-05-03 RX ADMIN — FENTANYL CITRATE 100 MCG: 50 INJECTION INTRAMUSCULAR; INTRAVENOUS at 09:07

## 2017-05-03 RX ADMIN — SODIUM CHLORIDE, POTASSIUM CHLORIDE, SODIUM LACTATE AND CALCIUM CHLORIDE: 600; 310; 30; 20 INJECTION, SOLUTION INTRAVENOUS at 12:37

## 2017-05-03 RX ADMIN — PHENYLEPHRINE HYDROCHLORIDE 100 MCG: 10 INJECTION INTRAVENOUS at 10:51

## 2017-05-03 RX ADMIN — PHENYLEPHRINE HYDROCHLORIDE 100 MCG: 10 INJECTION INTRAVENOUS at 12:47

## 2017-05-03 RX ADMIN — SUCCINYLCHOLINE CHLORIDE 120 MG: 20 INJECTION, SOLUTION INTRAMUSCULAR; INTRAVENOUS; PARENTERAL at 08:20

## 2017-05-03 RX ADMIN — Medication: at 19:33

## 2017-05-03 RX ADMIN — PHENYLEPHRINE HYDROCHLORIDE 100 MCG: 10 INJECTION INTRAVENOUS at 13:51

## 2017-05-03 RX ADMIN — PROPOFOL 200 MG: 10 INJECTION, EMULSION INTRAVENOUS at 08:20

## 2017-05-03 RX ADMIN — LIDOCAINE HYDROCHLORIDE 1 EACH: 40 SPRAY LARYNGEAL; TRANSTRACHEAL at 08:23

## 2017-05-03 RX ADMIN — HYDROMORPHONE HYDROCHLORIDE 0.5 MG: 2 INJECTION, SOLUTION INTRAMUSCULAR; INTRAVENOUS; SUBCUTANEOUS at 09:26

## 2017-05-03 RX ADMIN — SODIUM CHLORIDE, POTASSIUM CHLORIDE, SODIUM LACTATE AND CALCIUM CHLORIDE 9 ML/HR: 600; 310; 30; 20 INJECTION, SOLUTION INTRAVENOUS at 07:21

## 2017-05-03 RX ADMIN — PHENYLEPHRINE HYDROCHLORIDE 100 MCG: 10 INJECTION INTRAVENOUS at 13:05

## 2017-05-03 RX ADMIN — PHENYLEPHRINE HYDROCHLORIDE 100 MCG: 10 INJECTION INTRAVENOUS at 12:49

## 2017-05-03 RX ADMIN — CLINDAMYCIN PHOSPHATE 900 MG: 18 INJECTION, SOLUTION INTRAVENOUS at 16:13

## 2017-05-03 RX ADMIN — MIDAZOLAM 2 MG: 1 INJECTION INTRAMUSCULAR; INTRAVENOUS at 07:21

## 2017-05-03 RX ADMIN — PHENYLEPHRINE HYDROCHLORIDE 100 MCG: 10 INJECTION INTRAVENOUS at 13:10

## 2017-05-03 RX ADMIN — VANCOMYCIN HYDROCHLORIDE 1250 MG: 1 INJECTION, POWDER, LYOPHILIZED, FOR SOLUTION INTRAVENOUS at 07:21

## 2017-05-03 RX ADMIN — FENTANYL CITRATE 50 MCG: 50 INJECTION INTRAMUSCULAR; INTRAVENOUS at 10:27

## 2017-05-03 RX ADMIN — FENTANYL CITRATE 50 MCG: 50 INJECTION INTRAMUSCULAR; INTRAVENOUS at 08:27

## 2017-05-03 RX ADMIN — EPHEDRINE SULFATE 5 MG: 50 INJECTION INTRAMUSCULAR; INTRAVENOUS; SUBCUTANEOUS at 16:28

## 2017-05-03 RX ADMIN — SODIUM CHLORIDE, POTASSIUM CHLORIDE, SODIUM LACTATE AND CALCIUM CHLORIDE 9 ML/HR: 600; 310; 30; 20 INJECTION, SOLUTION INTRAVENOUS at 07:45

## 2017-05-03 RX ADMIN — SODIUM CHLORIDE, POTASSIUM CHLORIDE, SODIUM LACTATE AND CALCIUM CHLORIDE: 600; 310; 30; 20 INJECTION, SOLUTION INTRAVENOUS at 08:29

## 2017-05-03 RX ADMIN — HYDROMORPHONE HYDROCHLORIDE 0.5 MG: 2 INJECTION, SOLUTION INTRAMUSCULAR; INTRAVENOUS; SUBCUTANEOUS at 12:24

## 2017-05-03 RX ADMIN — PHENYLEPHRINE HYDROCHLORIDE 50 MCG: 10 INJECTION INTRAVENOUS at 16:28

## 2017-05-03 RX ADMIN — MIDAZOLAM HYDROCHLORIDE 2 MG: 1 INJECTION, SOLUTION INTRAMUSCULAR; INTRAVENOUS at 08:12

## 2017-05-03 RX ADMIN — ROCURONIUM BROMIDE 10 MG: 10 INJECTION INTRAVENOUS at 08:20

## 2017-05-03 RX ADMIN — FAMOTIDINE 20 MG: 10 INJECTION, SOLUTION INTRAVENOUS at 07:20

## 2017-05-03 RX ADMIN — PROPOFOL 100 MG: 10 INJECTION, EMULSION INTRAVENOUS at 11:09

## 2017-05-03 RX ADMIN — PHENYLEPHRINE HYDROCHLORIDE 100 MCG: 10 INJECTION INTRAVENOUS at 13:28

## 2017-05-03 RX ADMIN — FENTANYL CITRATE 50 MCG: 50 INJECTION INTRAMUSCULAR; INTRAVENOUS at 10:26

## 2017-05-03 RX ADMIN — EPHEDRINE SULFATE 10 MG: 50 INJECTION INTRAMUSCULAR; INTRAVENOUS; SUBCUTANEOUS at 09:00

## 2017-05-03 RX ADMIN — SODIUM CHLORIDE, POTASSIUM CHLORIDE, SODIUM LACTATE AND CALCIUM CHLORIDE: 600; 310; 30; 20 INJECTION, SOLUTION INTRAVENOUS at 11:49

## 2017-05-03 RX ADMIN — EPHEDRINE SULFATE 10 MG: 50 INJECTION INTRAMUSCULAR; INTRAVENOUS; SUBCUTANEOUS at 08:50

## 2017-05-03 RX ADMIN — HYDROMORPHONE HYDROCHLORIDE 0.5 MG: 1 INJECTION, SOLUTION INTRAMUSCULAR; INTRAVENOUS; SUBCUTANEOUS at 19:55

## 2017-05-03 RX ADMIN — SODIUM CHLORIDE, POTASSIUM CHLORIDE, SODIUM LACTATE AND CALCIUM CHLORIDE: 600; 310; 30; 20 INJECTION, SOLUTION INTRAVENOUS at 13:17

## 2017-05-03 RX ADMIN — MIDAZOLAM 1 MG: 1 INJECTION INTRAMUSCULAR; INTRAVENOUS at 07:44

## 2017-05-03 RX ADMIN — PHENYLEPHRINE HYDROCHLORIDE 100 MCG: 10 INJECTION INTRAVENOUS at 11:35

## 2017-05-03 RX ADMIN — HYDROMORPHONE HYDROCHLORIDE 0.5 MG: 2 INJECTION, SOLUTION INTRAMUSCULAR; INTRAVENOUS; SUBCUTANEOUS at 09:23

## 2017-05-03 RX ADMIN — PROMETHAZINE HYDROCHLORIDE 12.5 MG: 25 INJECTION INTRAMUSCULAR; INTRAVENOUS at 19:45

## 2017-05-03 RX ADMIN — HYDROMORPHONE HYDROCHLORIDE 0.5 MG: 2 INJECTION, SOLUTION INTRAMUSCULAR; INTRAVENOUS; SUBCUTANEOUS at 10:21

## 2017-05-03 RX ADMIN — MIDAZOLAM 1 MG: 1 INJECTION INTRAMUSCULAR; INTRAVENOUS at 07:52

## 2017-05-03 RX ADMIN — LIDOCAINE HYDROCHLORIDE 60 MG: 20 INJECTION, SOLUTION INFILTRATION; PERINEURAL at 08:20

## 2017-05-04 ENCOUNTER — APPOINTMENT (OUTPATIENT)
Dept: GENERAL RADIOLOGY | Facility: HOSPITAL | Age: 62
End: 2017-05-04

## 2017-05-04 LAB
BASE EXCESS BLDA CALC-SCNC: -4 MMOL/L (ref -5–5)
CO2 BLDA-SCNC: 24 MMOL/L (ref 24–29)
GLUCOSE BLDC GLUCOMTR-MCNC: 156 MG/DL (ref 70–130)
HCO3 BLDA-SCNC: 22.5 MMOL/L (ref 22–26)
HCT VFR BLDA CALC: 33 % (ref 38–51)
HGB BLDA-MCNC: 11.2 G/DL (ref 12–17)
PCO2 BLDA: 43.3 MM HG (ref 35–45)
PH BLDA: 7.32 PH UNITS (ref 7.35–7.6)
PO2 BLDA: 173 MMHG (ref 80–105)
POTASSIUM BLDA-SCNC: 3.5 MMOL/L (ref 3.5–4.9)
SAO2 % BLDA: 99 % (ref 95–98)

## 2017-05-04 PROCEDURE — 99024 POSTOP FOLLOW-UP VISIT: CPT | Performed by: NEUROLOGICAL SURGERY

## 2017-05-04 PROCEDURE — 72040 X-RAY EXAM NECK SPINE 2-3 VW: CPT

## 2017-05-04 PROCEDURE — 97161 PT EVAL LOW COMPLEX 20 MIN: CPT

## 2017-05-04 PROCEDURE — 25010000002 DIPHENHYDRAMINE PER 50 MG: Performed by: NEUROLOGICAL SURGERY

## 2017-05-04 PROCEDURE — 25010000002 ONDANSETRON PER 1 MG: Performed by: NEUROLOGICAL SURGERY

## 2017-05-04 RX ORDER — NALOXONE HYDROCHLORIDE 0.4 MG/ML
0.4 INJECTION, SOLUTION INTRAMUSCULAR; INTRAVENOUS; SUBCUTANEOUS AS NEEDED
Status: DISCONTINUED | OUTPATIENT
Start: 2017-05-04 | End: 2017-05-10 | Stop reason: HOSPADM

## 2017-05-04 RX ORDER — HYDROMORPHONE HYDROCHLORIDE 1 MG/ML
0.5 INJECTION, SOLUTION INTRAMUSCULAR; INTRAVENOUS; SUBCUTANEOUS
Status: DISCONTINUED | OUTPATIENT
Start: 2017-05-04 | End: 2017-05-10 | Stop reason: HOSPADM

## 2017-05-04 RX ORDER — SCOLOPAMINE TRANSDERMAL SYSTEM 1 MG/1
1 PATCH, EXTENDED RELEASE TRANSDERMAL
Status: DISCONTINUED | OUTPATIENT
Start: 2017-05-04 | End: 2017-05-10 | Stop reason: HOSPADM

## 2017-05-04 RX ORDER — SENNA AND DOCUSATE SODIUM 50; 8.6 MG/1; MG/1
2 TABLET, FILM COATED ORAL NIGHTLY
Status: DISCONTINUED | OUTPATIENT
Start: 2017-05-04 | End: 2017-05-10 | Stop reason: HOSPADM

## 2017-05-04 RX ORDER — OXYCODONE HYDROCHLORIDE AND ACETAMINOPHEN 5; 325 MG/1; MG/1
2 TABLET ORAL EVERY 6 HOURS PRN
Status: DISCONTINUED | OUTPATIENT
Start: 2017-05-04 | End: 2017-05-10 | Stop reason: HOSPADM

## 2017-05-04 RX ORDER — DOCUSATE SODIUM 100 MG/1
100 CAPSULE, LIQUID FILLED ORAL DAILY
Status: DISCONTINUED | OUTPATIENT
Start: 2017-05-04 | End: 2017-05-10 | Stop reason: HOSPADM

## 2017-05-04 RX ORDER — OXYCODONE HYDROCHLORIDE AND ACETAMINOPHEN 5; 325 MG/1; MG/1
1 TABLET ORAL EVERY 4 HOURS PRN
Status: DISCONTINUED | OUTPATIENT
Start: 2017-05-04 | End: 2017-05-10 | Stop reason: HOSPADM

## 2017-05-04 RX ORDER — METHOCARBAMOL 500 MG/1
500 TABLET, FILM COATED ORAL EVERY 8 HOURS SCHEDULED
Status: DISCONTINUED | OUTPATIENT
Start: 2017-05-04 | End: 2017-05-05

## 2017-05-04 RX ADMIN — DIPHENHYDRAMINE HYDROCHLORIDE 25 MG: 50 INJECTION, SOLUTION INTRAMUSCULAR; INTRAVENOUS at 08:27

## 2017-05-04 RX ADMIN — ONDANSETRON 4 MG: 2 INJECTION INTRAMUSCULAR; INTRAVENOUS at 14:33

## 2017-05-04 RX ADMIN — DOCUSATE SODIUM,SENNOSIDES 2 TABLET: 50; 8.6 TABLET, FILM COATED ORAL at 19:51

## 2017-05-04 RX ADMIN — ONDANSETRON 4 MG: 2 INJECTION INTRAMUSCULAR; INTRAVENOUS at 22:47

## 2017-05-04 RX ADMIN — SCOPOLAMINE 1 PATCH: 1 PATCH, EXTENDED RELEASE TRANSDERMAL at 13:43

## 2017-05-04 RX ADMIN — DOCUSATE SODIUM 100 MG: 100 CAPSULE, LIQUID FILLED ORAL at 13:38

## 2017-05-04 RX ADMIN — OXYCODONE HYDROCHLORIDE AND ACETAMINOPHEN 2 TABLET: 5; 325 TABLET ORAL at 23:22

## 2017-05-04 RX ADMIN — DIPHENHYDRAMINE HYDROCHLORIDE 25 MG: 50 INJECTION, SOLUTION INTRAMUSCULAR; INTRAVENOUS at 19:50

## 2017-05-04 RX ADMIN — SODIUM CHLORIDE, POTASSIUM CHLORIDE, SODIUM LACTATE AND CALCIUM CHLORIDE 75 ML/HR: 600; 310; 30; 20 INJECTION, SOLUTION INTRAVENOUS at 22:14

## 2017-05-04 RX ADMIN — CLINDAMYCIN PHOSPHATE 600 MG: 12 INJECTION, SOLUTION INTRAMUSCULAR; INTRAVENOUS at 08:27

## 2017-05-04 RX ADMIN — OXYCODONE HYDROCHLORIDE AND ACETAMINOPHEN 1 TABLET: 5; 325 TABLET ORAL at 16:52

## 2017-05-04 RX ADMIN — METHOCARBAMOL 500 MG: 500 TABLET ORAL at 21:59

## 2017-05-04 RX ADMIN — SODIUM CHLORIDE, POTASSIUM CHLORIDE, SODIUM LACTATE AND CALCIUM CHLORIDE 75 ML/HR: 600; 310; 30; 20 INJECTION, SOLUTION INTRAVENOUS at 08:26

## 2017-05-04 RX ADMIN — METHOCARBAMOL 500 MG: 500 TABLET ORAL at 13:38

## 2017-05-04 RX ADMIN — ONDANSETRON 4 MG: 2 INJECTION INTRAMUSCULAR; INTRAVENOUS at 03:15

## 2017-05-04 RX ADMIN — CLINDAMYCIN PHOSPHATE 600 MG: 12 INJECTION, SOLUTION INTRAMUSCULAR; INTRAVENOUS at 01:30

## 2017-05-05 PROCEDURE — 99024 POSTOP FOLLOW-UP VISIT: CPT | Performed by: NURSE PRACTITIONER

## 2017-05-05 PROCEDURE — 97110 THERAPEUTIC EXERCISES: CPT

## 2017-05-05 PROCEDURE — 25010000002 HYDROMORPHONE PER 4 MG: Performed by: NURSE PRACTITIONER

## 2017-05-05 PROCEDURE — 25010000002 DEXAMETHASONE PER 1 MG: Performed by: NURSE PRACTITIONER

## 2017-05-05 PROCEDURE — 97530 THERAPEUTIC ACTIVITIES: CPT

## 2017-05-05 PROCEDURE — 97535 SELF CARE MNGMENT TRAINING: CPT

## 2017-05-05 RX ORDER — DEXAMETHASONE SODIUM PHOSPHATE 4 MG/ML
4 INJECTION, SOLUTION INTRA-ARTICULAR; INTRALESIONAL; INTRAMUSCULAR; INTRAVENOUS; SOFT TISSUE EVERY 8 HOURS
Status: COMPLETED | OUTPATIENT
Start: 2017-05-05 | End: 2017-05-06

## 2017-05-05 RX ORDER — DEXAMETHASONE SODIUM PHOSPHATE 4 MG/ML
4 INJECTION, SOLUTION INTRA-ARTICULAR; INTRALESIONAL; INTRAMUSCULAR; INTRAVENOUS; SOFT TISSUE EVERY 8 HOURS
Status: DISCONTINUED | OUTPATIENT
Start: 2017-05-05 | End: 2017-05-05

## 2017-05-05 RX ORDER — METHOCARBAMOL 750 MG/1
750 TABLET, FILM COATED ORAL EVERY 8 HOURS SCHEDULED
Status: DISCONTINUED | OUTPATIENT
Start: 2017-05-05 | End: 2017-05-10 | Stop reason: HOSPADM

## 2017-05-05 RX ADMIN — DOCUSATE SODIUM 100 MG: 100 CAPSULE, LIQUID FILLED ORAL at 09:15

## 2017-05-05 RX ADMIN — DEXAMETHASONE SODIUM PHOSPHATE 4 MG: 4 INJECTION, SOLUTION INTRAMUSCULAR; INTRAVENOUS at 20:40

## 2017-05-05 RX ADMIN — DEXAMETHASONE SODIUM PHOSPHATE 4 MG: 4 INJECTION, SOLUTION INTRAMUSCULAR; INTRAVENOUS at 14:20

## 2017-05-05 RX ADMIN — OXYCODONE HYDROCHLORIDE AND ACETAMINOPHEN 2 TABLET: 5; 325 TABLET ORAL at 20:40

## 2017-05-05 RX ADMIN — METHOCARBAMOL 750 MG: 750 TABLET ORAL at 15:18

## 2017-05-05 RX ADMIN — OXYCODONE HYDROCHLORIDE AND ACETAMINOPHEN 2 TABLET: 5; 325 TABLET ORAL at 11:08

## 2017-05-05 RX ADMIN — HYDROMORPHONE HYDROCHLORIDE 0.5 MG: 1 INJECTION, SOLUTION INTRAMUSCULAR; INTRAVENOUS; SUBCUTANEOUS at 02:39

## 2017-05-05 RX ADMIN — HYDROMORPHONE HYDROCHLORIDE 0.5 MG: 1 INJECTION, SOLUTION INTRAMUSCULAR; INTRAVENOUS; SUBCUTANEOUS at 09:15

## 2017-05-05 RX ADMIN — HYDROMORPHONE HYDROCHLORIDE 0.5 MG: 1 INJECTION, SOLUTION INTRAMUSCULAR; INTRAVENOUS; SUBCUTANEOUS at 14:20

## 2017-05-05 RX ADMIN — METHOCARBAMOL 500 MG: 500 TABLET ORAL at 05:29

## 2017-05-05 RX ADMIN — DOCUSATE SODIUM,SENNOSIDES 2 TABLET: 50; 8.6 TABLET, FILM COATED ORAL at 20:40

## 2017-05-05 RX ADMIN — METHOCARBAMOL 750 MG: 750 TABLET ORAL at 22:00

## 2017-05-06 PROCEDURE — 94799 UNLISTED PULMONARY SVC/PX: CPT

## 2017-05-06 PROCEDURE — 25010000002 DEXAMETHASONE PER 1 MG: Performed by: NURSE PRACTITIONER

## 2017-05-06 PROCEDURE — 99024 POSTOP FOLLOW-UP VISIT: CPT | Performed by: NURSE PRACTITIONER

## 2017-05-06 PROCEDURE — 97110 THERAPEUTIC EXERCISES: CPT

## 2017-05-06 RX ADMIN — METHOCARBAMOL 750 MG: 750 TABLET ORAL at 05:26

## 2017-05-06 RX ADMIN — OXYCODONE HYDROCHLORIDE AND ACETAMINOPHEN 1 TABLET: 5; 325 TABLET ORAL at 08:37

## 2017-05-06 RX ADMIN — METHOCARBAMOL 750 MG: 750 TABLET ORAL at 13:35

## 2017-05-06 RX ADMIN — OXYCODONE HYDROCHLORIDE AND ACETAMINOPHEN 1 TABLET: 5; 325 TABLET ORAL at 17:42

## 2017-05-06 RX ADMIN — DOCUSATE SODIUM 100 MG: 100 CAPSULE, LIQUID FILLED ORAL at 08:36

## 2017-05-06 RX ADMIN — OXYCODONE HYDROCHLORIDE AND ACETAMINOPHEN 1 TABLET: 5; 325 TABLET ORAL at 13:35

## 2017-05-06 RX ADMIN — METHOCARBAMOL 750 MG: 750 TABLET ORAL at 21:23

## 2017-05-06 RX ADMIN — BISACODYL 10 MG: 5 TABLET, COATED ORAL at 21:16

## 2017-05-06 RX ADMIN — DEXAMETHASONE SODIUM PHOSPHATE 4 MG: 4 INJECTION, SOLUTION INTRAMUSCULAR; INTRAVENOUS at 04:57

## 2017-05-07 PROCEDURE — 94799 UNLISTED PULMONARY SVC/PX: CPT

## 2017-05-07 PROCEDURE — 99024 POSTOP FOLLOW-UP VISIT: CPT | Performed by: NEUROLOGICAL SURGERY

## 2017-05-07 PROCEDURE — 97110 THERAPEUTIC EXERCISES: CPT

## 2017-05-07 RX ADMIN — OXYCODONE HYDROCHLORIDE AND ACETAMINOPHEN 1 TABLET: 5; 325 TABLET ORAL at 12:34

## 2017-05-07 RX ADMIN — OXYCODONE HYDROCHLORIDE AND ACETAMINOPHEN 1 TABLET: 5; 325 TABLET ORAL at 01:51

## 2017-05-07 RX ADMIN — OXYCODONE HYDROCHLORIDE AND ACETAMINOPHEN 1 TABLET: 5; 325 TABLET ORAL at 21:20

## 2017-05-07 RX ADMIN — METHOCARBAMOL 750 MG: 750 TABLET ORAL at 21:21

## 2017-05-07 RX ADMIN — DOCUSATE SODIUM,SENNOSIDES 2 TABLET: 50; 8.6 TABLET, FILM COATED ORAL at 21:21

## 2017-05-07 RX ADMIN — DOCUSATE SODIUM 100 MG: 100 CAPSULE, LIQUID FILLED ORAL at 08:56

## 2017-05-07 RX ADMIN — METHOCARBAMOL 750 MG: 750 TABLET ORAL at 13:41

## 2017-05-07 RX ADMIN — METHOCARBAMOL 750 MG: 750 TABLET ORAL at 06:40

## 2017-05-07 RX ADMIN — SCOPOLAMINE 1 PATCH: 1 PATCH, EXTENDED RELEASE TRANSDERMAL at 11:27

## 2017-05-07 RX ADMIN — OXYCODONE HYDROCHLORIDE AND ACETAMINOPHEN 1 TABLET: 5; 325 TABLET ORAL at 16:51

## 2017-05-07 RX ADMIN — OXYCODONE HYDROCHLORIDE AND ACETAMINOPHEN 1 TABLET: 5; 325 TABLET ORAL at 08:57

## 2017-05-08 PROCEDURE — 99024 POSTOP FOLLOW-UP VISIT: CPT | Performed by: NURSE PRACTITIONER

## 2017-05-08 PROCEDURE — 97110 THERAPEUTIC EXERCISES: CPT

## 2017-05-08 PROCEDURE — 97535 SELF CARE MNGMENT TRAINING: CPT

## 2017-05-08 PROCEDURE — 25010000002 ONDANSETRON PER 1 MG: Performed by: NEUROLOGICAL SURGERY

## 2017-05-08 RX ADMIN — OXYCODONE HYDROCHLORIDE AND ACETAMINOPHEN 1 TABLET: 5; 325 TABLET ORAL at 20:53

## 2017-05-08 RX ADMIN — METHOCARBAMOL 750 MG: 750 TABLET ORAL at 07:31

## 2017-05-08 RX ADMIN — OXYCODONE HYDROCHLORIDE AND ACETAMINOPHEN 1 TABLET: 5; 325 TABLET ORAL at 12:17

## 2017-05-08 RX ADMIN — OXYCODONE HYDROCHLORIDE AND ACETAMINOPHEN 1 TABLET: 5; 325 TABLET ORAL at 07:31

## 2017-05-08 RX ADMIN — DOCUSATE SODIUM 100 MG: 100 CAPSULE, LIQUID FILLED ORAL at 09:58

## 2017-05-08 RX ADMIN — METHOCARBAMOL 750 MG: 750 TABLET ORAL at 15:20

## 2017-05-08 RX ADMIN — OXYCODONE HYDROCHLORIDE AND ACETAMINOPHEN 1 TABLET: 5; 325 TABLET ORAL at 16:58

## 2017-05-08 RX ADMIN — METHOCARBAMOL 750 MG: 750 TABLET ORAL at 20:53

## 2017-05-08 RX ADMIN — OXYCODONE HYDROCHLORIDE AND ACETAMINOPHEN 2 TABLET: 5; 325 TABLET ORAL at 01:04

## 2017-05-09 PROCEDURE — 97110 THERAPEUTIC EXERCISES: CPT

## 2017-05-09 PROCEDURE — 99024 POSTOP FOLLOW-UP VISIT: CPT | Performed by: NURSE PRACTITIONER

## 2017-05-09 RX ORDER — LACTULOSE 10 G/15ML
10 SOLUTION ORAL 2 TIMES DAILY
Status: DISCONTINUED | OUTPATIENT
Start: 2017-05-09 | End: 2017-05-09

## 2017-05-09 RX ORDER — POLYETHYLENE GLYCOL 3350 17 G/17G
17 POWDER, FOR SOLUTION ORAL DAILY
Status: DISCONTINUED | OUTPATIENT
Start: 2017-05-09 | End: 2017-05-10 | Stop reason: HOSPADM

## 2017-05-09 RX ADMIN — OXYCODONE HYDROCHLORIDE AND ACETAMINOPHEN 1 TABLET: 5; 325 TABLET ORAL at 17:07

## 2017-05-09 RX ADMIN — METHOCARBAMOL 750 MG: 750 TABLET ORAL at 06:51

## 2017-05-09 RX ADMIN — OXYCODONE HYDROCHLORIDE AND ACETAMINOPHEN 1 TABLET: 5; 325 TABLET ORAL at 10:28

## 2017-05-09 RX ADMIN — DOCUSATE SODIUM 100 MG: 100 CAPSULE, LIQUID FILLED ORAL at 10:25

## 2017-05-09 RX ADMIN — OXYCODONE HYDROCHLORIDE AND ACETAMINOPHEN 1 TABLET: 5; 325 TABLET ORAL at 21:33

## 2017-05-09 RX ADMIN — OXYCODONE HYDROCHLORIDE AND ACETAMINOPHEN 1 TABLET: 5; 325 TABLET ORAL at 06:35

## 2017-05-09 RX ADMIN — OXYCODONE HYDROCHLORIDE AND ACETAMINOPHEN 1 TABLET: 5; 325 TABLET ORAL at 01:23

## 2017-05-09 RX ADMIN — OXYCODONE HYDROCHLORIDE AND ACETAMINOPHEN 1 TABLET: 5; 325 TABLET ORAL at 10:27

## 2017-05-09 RX ADMIN — METHOCARBAMOL 750 MG: 750 TABLET ORAL at 21:33

## 2017-05-09 RX ADMIN — METHOCARBAMOL 750 MG: 750 TABLET ORAL at 17:07

## 2017-05-09 RX ADMIN — DOCUSATE SODIUM,SENNOSIDES 2 TABLET: 50; 8.6 TABLET, FILM COATED ORAL at 21:33

## 2017-05-10 ENCOUNTER — HOSPITAL ENCOUNTER (INPATIENT)
Facility: HOSPITAL | Age: 62
LOS: 13 days | Discharge: HOME HEALTH | End: 2017-05-23
Attending: PHYSICAL MEDICINE & REHABILITATION | Admitting: PHYSICAL MEDICINE & REHABILITATION

## 2017-05-10 VITALS
SYSTOLIC BLOOD PRESSURE: 112 MMHG | WEIGHT: 188.3 LBS | TEMPERATURE: 97.8 F | BODY MASS INDEX: 33.36 KG/M2 | HEART RATE: 97 BPM | DIASTOLIC BLOOD PRESSURE: 85 MMHG | OXYGEN SATURATION: 94 % | HEIGHT: 63 IN | RESPIRATION RATE: 18 BRPM

## 2017-05-10 DIAGNOSIS — T84.216A HARDWARE FAILURE OF ANTERIOR COLUMN OF SPINE (HCC): ICD-10-CM

## 2017-05-10 DIAGNOSIS — Z74.09 IMPAIRED MOBILITY: Primary | ICD-10-CM

## 2017-05-10 DIAGNOSIS — M54.2 NECK PAIN OF OVER 3 MONTHS DURATION: ICD-10-CM

## 2017-05-10 PROCEDURE — 99024 POSTOP FOLLOW-UP VISIT: CPT | Performed by: NURSE PRACTITIONER

## 2017-05-10 PROCEDURE — 25010000002 ONDANSETRON PER 1 MG: Performed by: NEUROLOGICAL SURGERY

## 2017-05-10 PROCEDURE — 97110 THERAPEUTIC EXERCISES: CPT

## 2017-05-10 PROCEDURE — 97535 SELF CARE MNGMENT TRAINING: CPT | Performed by: OCCUPATIONAL THERAPIST

## 2017-05-10 PROCEDURE — 97110 THERAPEUTIC EXERCISES: CPT | Performed by: OCCUPATIONAL THERAPIST

## 2017-05-10 RX ORDER — SENNA AND DOCUSATE SODIUM 50; 8.6 MG/1; MG/1
2 TABLET, FILM COATED ORAL NIGHTLY
Status: DISCONTINUED | OUTPATIENT
Start: 2017-05-10 | End: 2017-05-23

## 2017-05-10 RX ORDER — DIPHENHYDRAMINE HYDROCHLORIDE 50 MG/ML
25 INJECTION INTRAMUSCULAR; INTRAVENOUS EVERY 6 HOURS PRN
Status: CANCELLED | OUTPATIENT
Start: 2017-05-10

## 2017-05-10 RX ORDER — BISACODYL 5 MG/1
10 TABLET, DELAYED RELEASE ORAL DAILY PRN
Status: CANCELLED | OUTPATIENT
Start: 2017-05-10

## 2017-05-10 RX ORDER — NALOXONE HYDROCHLORIDE 0.4 MG/ML
0.4 INJECTION, SOLUTION INTRAMUSCULAR; INTRAVENOUS; SUBCUTANEOUS AS NEEDED
Status: CANCELLED | OUTPATIENT
Start: 2017-05-10

## 2017-05-10 RX ORDER — DOCUSATE SODIUM 100 MG/1
100 CAPSULE, LIQUID FILLED ORAL DAILY
Status: CANCELLED | OUTPATIENT
Start: 2017-05-11

## 2017-05-10 RX ORDER — ONDANSETRON 2 MG/ML
4 INJECTION INTRAMUSCULAR; INTRAVENOUS EVERY 6 HOURS PRN
Status: DISCONTINUED | OUTPATIENT
Start: 2017-05-10 | End: 2017-05-23

## 2017-05-10 RX ORDER — HYDROMORPHONE HYDROCHLORIDE 1 MG/ML
0.5 INJECTION, SOLUTION INTRAMUSCULAR; INTRAVENOUS; SUBCUTANEOUS
Status: DISCONTINUED | OUTPATIENT
Start: 2017-05-10 | End: 2017-05-11

## 2017-05-10 RX ORDER — ONDANSETRON 4 MG/1
4 TABLET, FILM COATED ORAL EVERY 6 HOURS PRN
Status: DISCONTINUED | OUTPATIENT
Start: 2017-05-10 | End: 2017-05-23

## 2017-05-10 RX ORDER — OXYCODONE HYDROCHLORIDE AND ACETAMINOPHEN 5; 325 MG/1; MG/1
2 TABLET ORAL EVERY 6 HOURS PRN
Status: DISCONTINUED | OUTPATIENT
Start: 2017-05-10 | End: 2017-05-11

## 2017-05-10 RX ORDER — OXYCODONE HYDROCHLORIDE AND ACETAMINOPHEN 5; 325 MG/1; MG/1
1 TABLET ORAL EVERY 4 HOURS PRN
Status: CANCELLED | OUTPATIENT
Start: 2017-05-10 | End: 2017-05-14

## 2017-05-10 RX ORDER — ONDANSETRON 4 MG/1
4 TABLET, ORALLY DISINTEGRATING ORAL EVERY 6 HOURS PRN
Status: CANCELLED | OUTPATIENT
Start: 2017-05-10

## 2017-05-10 RX ORDER — ACETAMINOPHEN 325 MG/1
650 TABLET ORAL EVERY 4 HOURS PRN
Status: CANCELLED | OUTPATIENT
Start: 2017-05-10

## 2017-05-10 RX ORDER — OXYCODONE HYDROCHLORIDE AND ACETAMINOPHEN 5; 325 MG/1; MG/1
1 TABLET ORAL EVERY 4 HOURS PRN
Status: DISCONTINUED | OUTPATIENT
Start: 2017-05-10 | End: 2017-05-11

## 2017-05-10 RX ORDER — OXYCODONE HYDROCHLORIDE AND ACETAMINOPHEN 5; 325 MG/1; MG/1
2 TABLET ORAL EVERY 6 HOURS PRN
Status: CANCELLED | OUTPATIENT
Start: 2017-05-10 | End: 2017-05-14

## 2017-05-10 RX ORDER — METHOCARBAMOL 750 MG/1
750 TABLET, FILM COATED ORAL EVERY 8 HOURS SCHEDULED
Status: DISCONTINUED | OUTPATIENT
Start: 2017-05-10 | End: 2017-05-23 | Stop reason: HOSPADM

## 2017-05-10 RX ORDER — DOCUSATE SODIUM 100 MG/1
100 CAPSULE, LIQUID FILLED ORAL DAILY
Status: DISCONTINUED | OUTPATIENT
Start: 2017-05-11 | End: 2017-05-23

## 2017-05-10 RX ORDER — ONDANSETRON 2 MG/ML
4 INJECTION INTRAMUSCULAR; INTRAVENOUS EVERY 6 HOURS PRN
Status: CANCELLED | OUTPATIENT
Start: 2017-05-10

## 2017-05-10 RX ORDER — NALOXONE HYDROCHLORIDE 0.4 MG/ML
0.4 INJECTION, SOLUTION INTRAMUSCULAR; INTRAVENOUS; SUBCUTANEOUS AS NEEDED
Status: DISCONTINUED | OUTPATIENT
Start: 2017-05-10 | End: 2017-05-23

## 2017-05-10 RX ORDER — ACETAMINOPHEN 325 MG/1
650 TABLET ORAL EVERY 4 HOURS PRN
Status: DISCONTINUED | OUTPATIENT
Start: 2017-05-10 | End: 2017-05-23 | Stop reason: HOSPADM

## 2017-05-10 RX ORDER — BISACODYL 5 MG/1
10 TABLET, DELAYED RELEASE ORAL DAILY PRN
Status: DISCONTINUED | OUTPATIENT
Start: 2017-05-10 | End: 2017-05-23

## 2017-05-10 RX ORDER — SENNA AND DOCUSATE SODIUM 50; 8.6 MG/1; MG/1
2 TABLET, FILM COATED ORAL NIGHTLY
Status: CANCELLED | OUTPATIENT
Start: 2017-05-10

## 2017-05-10 RX ORDER — METHOCARBAMOL 750 MG/1
750 TABLET, FILM COATED ORAL EVERY 8 HOURS SCHEDULED
Status: CANCELLED | OUTPATIENT
Start: 2017-05-10

## 2017-05-10 RX ORDER — SCOLOPAMINE TRANSDERMAL SYSTEM 1 MG/1
1 PATCH, EXTENDED RELEASE TRANSDERMAL
Status: DISCONTINUED | OUTPATIENT
Start: 2017-05-13 | End: 2017-05-23

## 2017-05-10 RX ORDER — SCOLOPAMINE TRANSDERMAL SYSTEM 1 MG/1
1 PATCH, EXTENDED RELEASE TRANSDERMAL
Status: CANCELLED | OUTPATIENT
Start: 2017-05-13

## 2017-05-10 RX ORDER — ONDANSETRON 4 MG/1
4 TABLET, FILM COATED ORAL EVERY 6 HOURS PRN
Status: CANCELLED | OUTPATIENT
Start: 2017-05-10

## 2017-05-10 RX ORDER — POLYETHYLENE GLYCOL 3350 17 G/17G
17 POWDER, FOR SOLUTION ORAL DAILY
Status: CANCELLED | OUTPATIENT
Start: 2017-05-11

## 2017-05-10 RX ORDER — DIPHENHYDRAMINE HCL 25 MG
25 CAPSULE ORAL EVERY 6 HOURS PRN
Status: DISCONTINUED | OUTPATIENT
Start: 2017-05-10 | End: 2017-05-23

## 2017-05-10 RX ORDER — POLYETHYLENE GLYCOL 3350 17 G/17G
17 POWDER, FOR SOLUTION ORAL DAILY
Status: DISCONTINUED | OUTPATIENT
Start: 2017-05-11 | End: 2017-05-23

## 2017-05-10 RX ORDER — ONDANSETRON 4 MG/1
4 TABLET, ORALLY DISINTEGRATING ORAL EVERY 6 HOURS PRN
Status: DISCONTINUED | OUTPATIENT
Start: 2017-05-10 | End: 2017-05-23

## 2017-05-10 RX ADMIN — OXYCODONE HYDROCHLORIDE AND ACETAMINOPHEN 2 TABLET: 5; 325 TABLET ORAL at 10:59

## 2017-05-10 RX ADMIN — SCOPOLAMINE 1 PATCH: 1 PATCH, EXTENDED RELEASE TRANSDERMAL at 11:00

## 2017-05-10 RX ADMIN — DOCUSATE SODIUM 100 MG: 100 CAPSULE, LIQUID FILLED ORAL at 08:18

## 2017-05-10 RX ADMIN — DOCUSATE SODIUM,SENNOSIDES 2 TABLET: 50; 8.6 TABLET, FILM COATED ORAL at 21:04

## 2017-05-10 RX ADMIN — ONDANSETRON 4 MG: 2 INJECTION INTRAMUSCULAR; INTRAVENOUS at 08:18

## 2017-05-10 RX ADMIN — POLYETHYLENE GLYCOL 3350 17 G: 17 POWDER, FOR SOLUTION ORAL at 08:18

## 2017-05-10 RX ADMIN — METHOCARBAMOL 750 MG: 750 TABLET ORAL at 21:04

## 2017-05-10 RX ADMIN — OXYCODONE HYDROCHLORIDE AND ACETAMINOPHEN 2 TABLET: 5; 325 TABLET ORAL at 17:25

## 2017-05-10 RX ADMIN — OXYCODONE HYDROCHLORIDE AND ACETAMINOPHEN 1 TABLET: 5; 325 TABLET ORAL at 01:25

## 2017-05-10 RX ADMIN — OXYCODONE HYDROCHLORIDE AND ACETAMINOPHEN 1 TABLET: 5; 325 TABLET ORAL at 23:59

## 2017-05-10 RX ADMIN — OXYCODONE HYDROCHLORIDE AND ACETAMINOPHEN 1 TABLET: 5; 325 TABLET ORAL at 06:52

## 2017-05-10 RX ADMIN — METHOCARBAMOL 750 MG: 750 TABLET ORAL at 06:52

## 2017-05-10 RX ADMIN — METHOCARBAMOL 750 MG: 750 TABLET ORAL at 14:22

## 2017-05-11 PROBLEM — M43.22 CERVICAL VERTEBRAL FUSION: Status: ACTIVE | Noted: 2017-05-11

## 2017-05-11 PROCEDURE — 97165 OT EVAL LOW COMPLEX 30 MIN: CPT

## 2017-05-11 PROCEDURE — 97535 SELF CARE MNGMENT TRAINING: CPT

## 2017-05-11 PROCEDURE — 97162 PT EVAL MOD COMPLEX 30 MIN: CPT

## 2017-05-11 PROCEDURE — 97110 THERAPEUTIC EXERCISES: CPT

## 2017-05-11 RX ORDER — OXYCODONE HYDROCHLORIDE 5 MG/1
5 TABLET ORAL EVERY 4 HOURS PRN
Status: DISCONTINUED | OUTPATIENT
Start: 2017-05-11 | End: 2017-05-23 | Stop reason: HOSPADM

## 2017-05-11 RX ORDER — OXYCODONE HYDROCHLORIDE 5 MG/1
10 TABLET ORAL EVERY 4 HOURS PRN
Status: DISCONTINUED | OUTPATIENT
Start: 2017-05-11 | End: 2017-05-23 | Stop reason: HOSPADM

## 2017-05-11 RX ADMIN — METHOCARBAMOL 750 MG: 750 TABLET ORAL at 21:02

## 2017-05-11 RX ADMIN — OXYCODONE HYDROCHLORIDE 10 MG: 5 TABLET ORAL at 15:43

## 2017-05-11 RX ADMIN — OXYCODONE HYDROCHLORIDE 10 MG: 5 TABLET ORAL at 19:49

## 2017-05-11 RX ADMIN — DOCUSATE SODIUM 100 MG: 100 CAPSULE, LIQUID FILLED ORAL at 08:49

## 2017-05-11 RX ADMIN — OXYCODONE HYDROCHLORIDE AND ACETAMINOPHEN 2 TABLET: 5; 325 TABLET ORAL at 08:50

## 2017-05-11 RX ADMIN — METHOCARBAMOL 750 MG: 750 TABLET ORAL at 05:34

## 2017-05-11 RX ADMIN — DOCUSATE SODIUM,SENNOSIDES 2 TABLET: 50; 8.6 TABLET, FILM COATED ORAL at 21:02

## 2017-05-11 RX ADMIN — METHOCARBAMOL 750 MG: 750 TABLET ORAL at 12:07

## 2017-05-12 PROCEDURE — 94799 UNLISTED PULMONARY SVC/PX: CPT

## 2017-05-12 PROCEDURE — 97110 THERAPEUTIC EXERCISES: CPT

## 2017-05-12 PROCEDURE — 97535 SELF CARE MNGMENT TRAINING: CPT

## 2017-05-12 RX ADMIN — DOCUSATE SODIUM 100 MG: 100 CAPSULE, LIQUID FILLED ORAL at 08:14

## 2017-05-12 RX ADMIN — METHOCARBAMOL 750 MG: 750 TABLET ORAL at 05:51

## 2017-05-12 RX ADMIN — OXYCODONE HYDROCHLORIDE 10 MG: 5 TABLET ORAL at 01:53

## 2017-05-12 RX ADMIN — METHOCARBAMOL 750 MG: 750 TABLET ORAL at 13:02

## 2017-05-12 RX ADMIN — OXYCODONE HYDROCHLORIDE 10 MG: 5 TABLET ORAL at 20:49

## 2017-05-12 RX ADMIN — DOCUSATE SODIUM,SENNOSIDES 2 TABLET: 50; 8.6 TABLET, FILM COATED ORAL at 20:50

## 2017-05-12 RX ADMIN — OXYCODONE HYDROCHLORIDE 10 MG: 5 TABLET ORAL at 16:44

## 2017-05-12 RX ADMIN — METHOCARBAMOL 750 MG: 750 TABLET ORAL at 21:41

## 2017-05-12 RX ADMIN — OXYCODONE HYDROCHLORIDE 10 MG: 5 TABLET ORAL at 07:59

## 2017-05-12 RX ADMIN — OXYCODONE HYDROCHLORIDE 10 MG: 5 TABLET ORAL at 12:12

## 2017-05-13 PROCEDURE — 97110 THERAPEUTIC EXERCISES: CPT

## 2017-05-13 PROCEDURE — 97110 THERAPEUTIC EXERCISES: CPT | Performed by: OCCUPATIONAL THERAPIST

## 2017-05-13 PROCEDURE — 97535 SELF CARE MNGMENT TRAINING: CPT | Performed by: OCCUPATIONAL THERAPIST

## 2017-05-13 RX ADMIN — OXYCODONE HYDROCHLORIDE 10 MG: 5 TABLET ORAL at 21:46

## 2017-05-13 RX ADMIN — OXYCODONE HYDROCHLORIDE 10 MG: 5 TABLET ORAL at 17:39

## 2017-05-13 RX ADMIN — DOCUSATE SODIUM,SENNOSIDES 2 TABLET: 50; 8.6 TABLET, FILM COATED ORAL at 21:45

## 2017-05-13 RX ADMIN — OXYCODONE HYDROCHLORIDE 10 MG: 5 TABLET ORAL at 13:16

## 2017-05-13 RX ADMIN — METHOCARBAMOL 750 MG: 750 TABLET ORAL at 05:14

## 2017-05-13 RX ADMIN — OXYCODONE HYDROCHLORIDE 10 MG: 5 TABLET ORAL at 08:58

## 2017-05-13 RX ADMIN — OXYCODONE HYDROCHLORIDE 10 MG: 5 TABLET ORAL at 05:14

## 2017-05-13 RX ADMIN — SCOPOLAMINE 1 PATCH: 1 PATCH, EXTENDED RELEASE TRANSDERMAL at 12:25

## 2017-05-13 RX ADMIN — DOCUSATE SODIUM 100 MG: 100 CAPSULE, LIQUID FILLED ORAL at 08:49

## 2017-05-13 RX ADMIN — METHOCARBAMOL 750 MG: 750 TABLET ORAL at 14:25

## 2017-05-13 RX ADMIN — METHOCARBAMOL 750 MG: 750 TABLET ORAL at 21:11

## 2017-05-13 RX ADMIN — OXYCODONE HYDROCHLORIDE 10 MG: 5 TABLET ORAL at 00:25

## 2017-05-14 PROCEDURE — 97110 THERAPEUTIC EXERCISES: CPT

## 2017-05-14 PROCEDURE — 94799 UNLISTED PULMONARY SVC/PX: CPT

## 2017-05-14 RX ADMIN — OXYCODONE HYDROCHLORIDE 10 MG: 5 TABLET ORAL at 23:05

## 2017-05-14 RX ADMIN — POLYETHYLENE GLYCOL 3350 17 G: 17 POWDER, FOR SOLUTION ORAL at 09:10

## 2017-05-14 RX ADMIN — OXYCODONE HYDROCHLORIDE 10 MG: 5 TABLET ORAL at 10:04

## 2017-05-14 RX ADMIN — OXYCODONE HYDROCHLORIDE 10 MG: 5 TABLET ORAL at 15:00

## 2017-05-14 RX ADMIN — METHOCARBAMOL 750 MG: 750 TABLET ORAL at 13:56

## 2017-05-14 RX ADMIN — OXYCODONE HYDROCHLORIDE 10 MG: 5 TABLET ORAL at 19:00

## 2017-05-14 RX ADMIN — DOCUSATE SODIUM 100 MG: 100 CAPSULE, LIQUID FILLED ORAL at 09:10

## 2017-05-14 RX ADMIN — METHOCARBAMOL 750 MG: 750 TABLET ORAL at 05:41

## 2017-05-14 RX ADMIN — OXYCODONE HYDROCHLORIDE 10 MG: 5 TABLET ORAL at 02:01

## 2017-05-14 RX ADMIN — OXYCODONE HYDROCHLORIDE 10 MG: 5 TABLET ORAL at 05:58

## 2017-05-14 RX ADMIN — METHOCARBAMOL 750 MG: 750 TABLET ORAL at 20:53

## 2017-05-15 PROCEDURE — 97110 THERAPEUTIC EXERCISES: CPT

## 2017-05-15 PROCEDURE — 94799 UNLISTED PULMONARY SVC/PX: CPT

## 2017-05-15 PROCEDURE — 97535 SELF CARE MNGMENT TRAINING: CPT

## 2017-05-15 RX ADMIN — OXYCODONE HYDROCHLORIDE 10 MG: 5 TABLET ORAL at 11:31

## 2017-05-15 RX ADMIN — DOCUSATE SODIUM 100 MG: 100 CAPSULE, LIQUID FILLED ORAL at 08:28

## 2017-05-15 RX ADMIN — METHOCARBAMOL 750 MG: 750 TABLET ORAL at 14:33

## 2017-05-15 RX ADMIN — OXYCODONE HYDROCHLORIDE 10 MG: 5 TABLET ORAL at 07:27

## 2017-05-15 RX ADMIN — METHOCARBAMOL 750 MG: 750 TABLET ORAL at 22:05

## 2017-05-15 RX ADMIN — OXYCODONE HYDROCHLORIDE 10 MG: 5 TABLET ORAL at 17:50

## 2017-05-15 RX ADMIN — OXYCODONE HYDROCHLORIDE 10 MG: 5 TABLET ORAL at 02:58

## 2017-05-15 RX ADMIN — OXYCODONE HYDROCHLORIDE 10 MG: 5 TABLET ORAL at 22:05

## 2017-05-15 RX ADMIN — METHOCARBAMOL 750 MG: 750 TABLET ORAL at 05:18

## 2017-05-16 PROCEDURE — 97535 SELF CARE MNGMENT TRAINING: CPT

## 2017-05-16 PROCEDURE — 97110 THERAPEUTIC EXERCISES: CPT

## 2017-05-16 RX ADMIN — METHOCARBAMOL 750 MG: 750 TABLET ORAL at 21:14

## 2017-05-16 RX ADMIN — DOCUSATE SODIUM,SENNOSIDES 2 TABLET: 50; 8.6 TABLET, FILM COATED ORAL at 21:15

## 2017-05-16 RX ADMIN — OXYCODONE HYDROCHLORIDE 10 MG: 5 TABLET ORAL at 06:18

## 2017-05-16 RX ADMIN — METHOCARBAMOL 750 MG: 750 TABLET ORAL at 13:43

## 2017-05-16 RX ADMIN — OXYCODONE HYDROCHLORIDE 10 MG: 5 TABLET ORAL at 02:12

## 2017-05-16 RX ADMIN — OXYCODONE HYDROCHLORIDE 5 MG: 5 TABLET ORAL at 18:42

## 2017-05-16 RX ADMIN — OXYCODONE HYDROCHLORIDE 10 MG: 5 TABLET ORAL at 10:19

## 2017-05-16 RX ADMIN — POLYETHYLENE GLYCOL 3350 17 G: 17 POWDER, FOR SOLUTION ORAL at 08:27

## 2017-05-16 RX ADMIN — METHOCARBAMOL 750 MG: 750 TABLET ORAL at 06:18

## 2017-05-16 RX ADMIN — OXYCODONE HYDROCHLORIDE 5 MG: 5 TABLET ORAL at 14:36

## 2017-05-16 RX ADMIN — OXYCODONE HYDROCHLORIDE 10 MG: 5 TABLET ORAL at 22:38

## 2017-05-16 RX ADMIN — SCOPOLAMINE 1 PATCH: 1 PATCH, EXTENDED RELEASE TRANSDERMAL at 10:20

## 2017-05-16 RX ADMIN — DOCUSATE SODIUM 100 MG: 100 CAPSULE, LIQUID FILLED ORAL at 08:27

## 2017-05-17 PROCEDURE — 97110 THERAPEUTIC EXERCISES: CPT

## 2017-05-17 PROCEDURE — 97535 SELF CARE MNGMENT TRAINING: CPT

## 2017-05-17 RX ADMIN — METHOCARBAMOL 750 MG: 750 TABLET ORAL at 21:20

## 2017-05-17 RX ADMIN — POLYETHYLENE GLYCOL 3350 17 G: 17 POWDER, FOR SOLUTION ORAL at 08:19

## 2017-05-17 RX ADMIN — OXYCODONE HYDROCHLORIDE 10 MG: 5 TABLET ORAL at 21:20

## 2017-05-17 RX ADMIN — OXYCODONE HYDROCHLORIDE 10 MG: 5 TABLET ORAL at 06:49

## 2017-05-17 RX ADMIN — OXYCODONE HYDROCHLORIDE 10 MG: 5 TABLET ORAL at 02:45

## 2017-05-17 RX ADMIN — OXYCODONE HYDROCHLORIDE 5 MG: 5 TABLET ORAL at 11:14

## 2017-05-17 RX ADMIN — METHOCARBAMOL 750 MG: 750 TABLET ORAL at 14:02

## 2017-05-17 RX ADMIN — OXYCODONE HYDROCHLORIDE 5 MG: 5 TABLET ORAL at 17:16

## 2017-05-17 RX ADMIN — METHOCARBAMOL 750 MG: 750 TABLET ORAL at 05:34

## 2017-05-18 ENCOUNTER — APPOINTMENT (OUTPATIENT)
Dept: GENERAL RADIOLOGY | Facility: HOSPITAL | Age: 62
End: 2017-05-18

## 2017-05-18 PROCEDURE — 72040 X-RAY EXAM NECK SPINE 2-3 VW: CPT

## 2017-05-18 PROCEDURE — 97535 SELF CARE MNGMENT TRAINING: CPT

## 2017-05-18 PROCEDURE — 97110 THERAPEUTIC EXERCISES: CPT

## 2017-05-18 RX ORDER — BUPIVACAINE HYDROCHLORIDE 2.5 MG/ML
5 INJECTION, SOLUTION EPIDURAL; INFILTRATION; INTRACAUDAL ONCE
Status: DISCONTINUED | OUTPATIENT
Start: 2017-05-18 | End: 2017-05-23

## 2017-05-18 RX ORDER — LIDOCAINE HYDROCHLORIDE 10 MG/ML
5 INJECTION, SOLUTION INFILTRATION; PERINEURAL ONCE
Status: DISCONTINUED | OUTPATIENT
Start: 2017-05-18 | End: 2017-05-23

## 2017-05-18 RX ADMIN — METHOCARBAMOL 750 MG: 750 TABLET ORAL at 14:16

## 2017-05-18 RX ADMIN — OXYCODONE HYDROCHLORIDE 10 MG: 5 TABLET ORAL at 05:44

## 2017-05-18 RX ADMIN — OXYCODONE HYDROCHLORIDE 10 MG: 5 TABLET ORAL at 01:30

## 2017-05-18 RX ADMIN — OXYCODONE HYDROCHLORIDE 10 MG: 5 TABLET ORAL at 09:39

## 2017-05-18 RX ADMIN — OXYCODONE HYDROCHLORIDE 10 MG: 5 TABLET ORAL at 21:59

## 2017-05-18 RX ADMIN — OXYCODONE HYDROCHLORIDE 10 MG: 5 TABLET ORAL at 18:07

## 2017-05-18 RX ADMIN — METHOCARBAMOL 750 MG: 750 TABLET ORAL at 05:43

## 2017-05-18 RX ADMIN — OXYCODONE HYDROCHLORIDE 10 MG: 5 TABLET ORAL at 14:16

## 2017-05-18 RX ADMIN — METHOCARBAMOL 750 MG: 750 TABLET ORAL at 21:58

## 2017-05-19 ENCOUNTER — TELEPHONE (OUTPATIENT)
Dept: NEUROSURGERY | Facility: CLINIC | Age: 62
End: 2017-05-19

## 2017-05-19 PROCEDURE — 99024 POSTOP FOLLOW-UP VISIT: CPT | Performed by: NURSE PRACTITIONER

## 2017-05-19 PROCEDURE — 97110 THERAPEUTIC EXERCISES: CPT | Performed by: OCCUPATIONAL THERAPIST

## 2017-05-19 PROCEDURE — 3E023BZ INTRODUCTION OF ANESTHETIC AGENT INTO MUSCLE, PERCUTANEOUS APPROACH: ICD-10-PCS | Performed by: PHYSICAL MEDICINE & REHABILITATION

## 2017-05-19 PROCEDURE — 97110 THERAPEUTIC EXERCISES: CPT

## 2017-05-19 PROCEDURE — 94799 UNLISTED PULMONARY SVC/PX: CPT

## 2017-05-19 PROCEDURE — 97535 SELF CARE MNGMENT TRAINING: CPT | Performed by: OCCUPATIONAL THERAPIST

## 2017-05-19 RX ADMIN — OXYCODONE HYDROCHLORIDE 5 MG: 5 TABLET ORAL at 14:15

## 2017-05-19 RX ADMIN — OXYCODONE HYDROCHLORIDE 5 MG: 5 TABLET ORAL at 10:08

## 2017-05-19 RX ADMIN — METHOCARBAMOL 750 MG: 750 TABLET ORAL at 22:38

## 2017-05-19 RX ADMIN — OXYCODONE HYDROCHLORIDE 10 MG: 5 TABLET ORAL at 02:06

## 2017-05-19 RX ADMIN — METHOCARBAMOL 750 MG: 750 TABLET ORAL at 05:37

## 2017-05-19 RX ADMIN — OXYCODONE HYDROCHLORIDE 5 MG: 5 TABLET ORAL at 22:37

## 2017-05-19 RX ADMIN — OXYCODONE HYDROCHLORIDE 5 MG: 5 TABLET ORAL at 18:25

## 2017-05-19 RX ADMIN — OXYCODONE HYDROCHLORIDE 10 MG: 5 TABLET ORAL at 06:07

## 2017-05-19 RX ADMIN — SCOPOLAMINE 1 PATCH: 1 PATCH, EXTENDED RELEASE TRANSDERMAL at 12:00

## 2017-05-19 RX ADMIN — METHOCARBAMOL 750 MG: 750 TABLET ORAL at 14:15

## 2017-05-20 PROCEDURE — 97110 THERAPEUTIC EXERCISES: CPT | Performed by: PHYSICAL THERAPIST

## 2017-05-20 RX ADMIN — METHOCARBAMOL 750 MG: 750 TABLET ORAL at 21:20

## 2017-05-20 RX ADMIN — METHOCARBAMOL 750 MG: 750 TABLET ORAL at 06:06

## 2017-05-20 RX ADMIN — OXYCODONE HYDROCHLORIDE 10 MG: 5 TABLET ORAL at 02:38

## 2017-05-20 RX ADMIN — OXYCODONE HYDROCHLORIDE 10 MG: 5 TABLET ORAL at 19:02

## 2017-05-20 RX ADMIN — POLYETHYLENE GLYCOL 3350 17 G: 17 POWDER, FOR SOLUTION ORAL at 09:36

## 2017-05-20 RX ADMIN — OXYCODONE HYDROCHLORIDE 5 MG: 5 TABLET ORAL at 14:52

## 2017-05-20 RX ADMIN — OXYCODONE HYDROCHLORIDE 5 MG: 5 TABLET ORAL at 10:58

## 2017-05-20 RX ADMIN — OXYCODONE HYDROCHLORIDE 10 MG: 5 TABLET ORAL at 23:42

## 2017-05-20 RX ADMIN — METHOCARBAMOL 750 MG: 750 TABLET ORAL at 14:32

## 2017-05-20 RX ADMIN — OXYCODONE HYDROCHLORIDE 5 MG: 5 TABLET ORAL at 06:50

## 2017-05-21 PROCEDURE — 97110 THERAPEUTIC EXERCISES: CPT | Performed by: PHYSICAL THERAPIST

## 2017-05-21 RX ADMIN — METHOCARBAMOL 750 MG: 750 TABLET ORAL at 22:15

## 2017-05-21 RX ADMIN — OXYCODONE HYDROCHLORIDE 5 MG: 5 TABLET ORAL at 10:00

## 2017-05-21 RX ADMIN — OXYCODONE HYDROCHLORIDE 10 MG: 5 TABLET ORAL at 03:59

## 2017-05-21 RX ADMIN — OXYCODONE HYDROCHLORIDE 5 MG: 5 TABLET ORAL at 08:21

## 2017-05-21 RX ADMIN — OXYCODONE HYDROCHLORIDE 5 MG: 5 TABLET ORAL at 14:13

## 2017-05-21 RX ADMIN — METHOCARBAMOL 750 MG: 750 TABLET ORAL at 14:13

## 2017-05-21 RX ADMIN — OXYCODONE HYDROCHLORIDE 10 MG: 5 TABLET ORAL at 18:11

## 2017-05-21 RX ADMIN — OXYCODONE HYDROCHLORIDE 10 MG: 5 TABLET ORAL at 22:15

## 2017-05-21 RX ADMIN — METHOCARBAMOL 750 MG: 750 TABLET ORAL at 06:08

## 2017-05-22 PROCEDURE — 97110 THERAPEUTIC EXERCISES: CPT

## 2017-05-22 PROCEDURE — 97110 THERAPEUTIC EXERCISES: CPT | Performed by: OCCUPATIONAL THERAPIST

## 2017-05-22 PROCEDURE — 97535 SELF CARE MNGMENT TRAINING: CPT | Performed by: OCCUPATIONAL THERAPIST

## 2017-05-22 RX ADMIN — METHOCARBAMOL 750 MG: 750 TABLET ORAL at 21:40

## 2017-05-22 RX ADMIN — METHOCARBAMOL 750 MG: 750 TABLET ORAL at 06:41

## 2017-05-22 RX ADMIN — OXYCODONE HYDROCHLORIDE 10 MG: 5 TABLET ORAL at 02:18

## 2017-05-22 RX ADMIN — ONDANSETRON 4 MG: 4 TABLET, ORALLY DISINTEGRATING ORAL at 09:02

## 2017-05-22 RX ADMIN — OXYCODONE HYDROCHLORIDE 5 MG: 5 TABLET ORAL at 06:41

## 2017-05-22 RX ADMIN — OXYCODONE HYDROCHLORIDE 5 MG: 5 TABLET ORAL at 12:30

## 2017-05-22 RX ADMIN — METHOCARBAMOL 750 MG: 750 TABLET ORAL at 14:55

## 2017-05-22 RX ADMIN — OXYCODONE HYDROCHLORIDE 10 MG: 5 TABLET ORAL at 19:40

## 2017-05-22 RX ADMIN — SCOPOLAMINE 1 PATCH: 1 PATCH, EXTENDED RELEASE TRANSDERMAL at 11:00

## 2017-05-23 VITALS
BODY MASS INDEX: 34.82 KG/M2 | HEIGHT: 63 IN | DIASTOLIC BLOOD PRESSURE: 70 MMHG | RESPIRATION RATE: 20 BRPM | HEART RATE: 74 BPM | TEMPERATURE: 97.9 F | SYSTOLIC BLOOD PRESSURE: 115 MMHG | WEIGHT: 196.5 LBS | OXYGEN SATURATION: 96 %

## 2017-05-23 PROCEDURE — 97110 THERAPEUTIC EXERCISES: CPT

## 2017-05-23 RX ORDER — HYDROCODONE BITARTRATE AND ACETAMINOPHEN 10; 325 MG/1; MG/1
1 TABLET ORAL EVERY 4 HOURS PRN
Status: DISCONTINUED | OUTPATIENT
Start: 2017-05-23 | End: 2017-05-23

## 2017-05-23 RX ORDER — ACETAMINOPHEN 325 MG/1
650 TABLET ORAL EVERY 4 HOURS PRN
Refills: 0
Start: 2017-05-23

## 2017-05-23 RX ORDER — OXYCODONE HYDROCHLORIDE 10 MG/1
TABLET ORAL
Qty: 60 TABLET | Refills: 0 | Status: SHIPPED | OUTPATIENT
Start: 2017-05-23 | End: 2017-07-17

## 2017-05-23 RX ORDER — METHOCARBAMOL 750 MG/1
750 TABLET, FILM COATED ORAL EVERY 8 HOURS SCHEDULED
Qty: 45 TABLET | Refills: 1 | Status: SHIPPED | OUTPATIENT
Start: 2017-05-23 | End: 2017-06-20

## 2017-05-23 RX ADMIN — OXYCODONE HYDROCHLORIDE 10 MG: 5 TABLET ORAL at 02:51

## 2017-05-23 RX ADMIN — METHOCARBAMOL 750 MG: 750 TABLET ORAL at 14:29

## 2017-05-23 RX ADMIN — OXYCODONE HYDROCHLORIDE 5 MG: 5 TABLET ORAL at 08:33

## 2017-05-23 RX ADMIN — OXYCODONE HYDROCHLORIDE 5 MG: 5 TABLET ORAL at 12:37

## 2017-05-23 RX ADMIN — ONDANSETRON 4 MG: 4 TABLET, ORALLY DISINTEGRATING ORAL at 06:41

## 2017-06-20 ENCOUNTER — OFFICE VISIT (OUTPATIENT)
Dept: NEUROSURGERY | Facility: CLINIC | Age: 62
End: 2017-06-20

## 2017-06-20 ENCOUNTER — HOSPITAL ENCOUNTER (OUTPATIENT)
Dept: GENERAL RADIOLOGY | Facility: HOSPITAL | Age: 62
Discharge: HOME | End: 2017-06-20
Attending: NEUROLOGICAL SURGERY | Admitting: NEUROLOGICAL SURGERY

## 2017-06-20 VITALS
DIASTOLIC BLOOD PRESSURE: 86 MMHG | HEIGHT: 62 IN | SYSTOLIC BLOOD PRESSURE: 130 MMHG | RESPIRATION RATE: 16 BRPM | BODY MASS INDEX: 33.31 KG/M2 | WEIGHT: 181 LBS | HEART RATE: 68 BPM

## 2017-06-20 DIAGNOSIS — M43.22 CERVICAL VERTEBRAL FUSION: Primary | ICD-10-CM

## 2017-06-20 DIAGNOSIS — M43.10 ANTEROLISTHESIS: ICD-10-CM

## 2017-06-20 PROCEDURE — 72040 X-RAY EXAM NECK SPINE 2-3 VW: CPT

## 2017-06-20 PROCEDURE — 99024 POSTOP FOLLOW-UP VISIT: CPT | Performed by: NEUROLOGICAL SURGERY

## 2017-06-22 NOTE — OP NOTE
Procedure Note    Alannah Delgado  May 3, 2017  [id number]    SURGEON  Ha Guidry MD    ASSISTANT:  Daja Jorgensen CFA  INDICATION:  The patient has a kyphotic progressive deformity with extremely thin skin on the back part of her neck.  She has cervical spinal stenosis as a result of this and excruciating neck pain.  It is indicated to reduce her cervical kyphosis, repair her skin breakdown/impending skin breakdown, decompress her cervical spinal cord etc. an effort to improve the quality of her life and prevent further deterioration.    Pre-op Diagnosis:     Cervical swan neck deformity  Impending skin breakdown  Posterior instrumentation failure  Cervicothoracic kyphoscoliosis  C5/C6 anterolisthesis  Cervical spinal stenosis  Post-op Diagnosis:       Cervical swan neck deformity  Impending skin breakdown  Posterior instrumentation failure  Cervicothoracic kyphoscoliosis  C5/C6 anterolisthesis  Cervical spinal stenosis    PROCEDURE PERFORMED:  * No procedures listed *  1. C6 anterior cervical corpectomy and arthrodesis C5/6 and C6/7  2. Capac of autograft bone.  3. Placement of interbody prosthetic device (22X16X 14m Anatomic Peek)  4. Anterior instrumentation (70mm Elite Atlantis Plate, C4 15mm, C5 and C7 17mm, T1 16 mm on left and 15mm on right screws)  5. Microdissection technique with the use of the operating microscope  6.  Posterior cervical arthrodesis C2 to 3, C3 to 4, C4 to 5, C6 to 7, C7 to T1, T1 to T2, and T2 to T3.  6.a. Removal of prior spinal instrumentation  7.  Posterior cervical instrumentation-segmental (C2 24 mm, C3 left 24 and right 26 mm, C4 left 24 right 26 mm, C5 left 18 right 18 mm, C6 left 24 mm, C7 left 26 right 20 mm [all cervical screws 3.5 mm diameter], T1 left 4.5 x 30 right 4.5 x 34 mm, T2 4.5 x 34 mm bilaterally, T3 4 0.0 x 34 mm bilaterally - bilateral rods placed posteriorly into the screw heads - cross-link placed between C7 and T1).  8.  Capac of autograft  bone for bone graft.  8.a. Allograft for fusion posteriorly  9.  Application of halo ring and vest.    Anesthesia: General anesthesia      Estimated Blood Loss: 300 mL    Specimens:                none    Drains:   Posterior cervical drain.    Findings: Cervical kyphoscoliosis resulting in cervical spinal stenosis, extremely thin skin posteriorly with eminent skin breakdown. Anterior kyphosis with cord compression.    Complications: None    Details:    Cervical corpectomy - anterior apporach    EMG: The patient was brought to the operating room where general endotracheal anesthesia was induced. EMG electrodes were then placed into the trapezius, deltoid, biceps, triceps, and hand bilaterally. Continuous EMG monitoring was accomplished throughout the operation. Any changes that occurred in EMG were noted immediately by the operating surgeon and simple correction in technique silence the abnormal electrical activity.    Positioning/approach: Patient was placed onto the operating table in the supine position. Care was taken to pad all susceptible areas. The right side of the neck was shaved, prepped, and draped in usual sterile manner. An incision was made on the right side of the neck in one of the horizontal creases through the skin, subcutaneous tissues, platysma, and the anterior cervical fascia.  The fascia was opened along the anterior border of the sternocleidomastoid muscle and this muscle and the carotid sheath were reflected laterally while the laryngeal contents and esophagus reflected medially exposing the posterior cervical fascia which was opened exposing the longus coli musculature. The fascia of the muscles was coagulated and then opened and care was taken to embed the self-retaining retractor into the longus coli musculature.     Distraction posts were then placed one into the body of C5 and one into the body of C7 anterior annulotomy accomplished and the C5/6 and C6/7 disc spaces were  distracted.    Localization/discectomy: The cervical levels chosen were identified by placing a bent spinal needle into the disc and fluoroscopy was used in order to confirm the appropriate level. The annulotomy was expanded and using a combination of straight and curved curettes and pituitary rongeurs the soft tissue/nucleus pulposis and annulus were removed exposing the posterior osteophytic/disc complex.    Operating microscope: The operating microscope was draped sterilely and brought into the field and the rest of the operation was performed under operative magnification with microdissection technique and micro-illumination with the aid of the operating microscope.    Cervical arthrodesis/discectomy/decompression: Cervical arthrodesis was then accomplished at C5/6 and 6/7 by using the high-speed drill under microscopic conditions with copious irrigation and removing the first layer of the adjacent endplates at each level. Posterior osteophytectomy was then accomplished with the high-speed drill as well as bilateral neural foraminotomies. The remaining small portion of annulus as well as the posterior longitudinal ligament were then elevated with a small pituitary rongeur and a angled curette was placed underneath the ligament exposing the dura.  1 mm and 2 mm Kerrison rongeurs were then used in order to remove the posterior longitudinal ligament exposing the dura and to perform bilateral neural foraminotomies by gently going out into each of the neural foramen and identifying the exiting nerve root. Following neural foraminotomy and discectomy a small Marshall ball hook was used in order to go out into the neural foramen and confirm that there was no evidence of residual narrowing.    C6 Corpectomy: I removed at least 70% of the C6 vertebral body including the osterior cortex and the posterior longitudinal ligament    [place] of autograft bone: It should be noted that a bone  was placed in line in the  suction line and all drill trailings were collected to be used for autograft fusion.    Interbody prosthetic device: I then sized the cervical defect space to the above-noted width and chose the interbody prosthetic device. The device was stuffed with the collected autograft and then impacted into the disc space and slightly countersunk.    Anterior instrumentation: I then chose an Atlantis Elite plate of the above-noted length and, with the aid of fluoroscopic guidance, made sure that the size was appropriate. The above-noted screws were then placed and again confirmed with fluoroscopy to be of the appropriate length. The locking mechanism was tightened down according to 's specifications.    Closure: The wound was then copiously irrigated, hemostasis obtained. The wound was then closed and appropriate layers with 3-0 running Vicryl for the platysma, and Monocryl for a subcuticular skin closure topped off with Dermabond. The patient was then awakened from general endotracheal anesthesia, extubated and taken to recovery in stable good condition.    Posterior fusion:    Details:     Positioning: The patient's head was immobilized in the Lundberg-Ke head burkett.  The patient was turned prone onto the Ayaan table and care was taken to pad all susceptible areas.  Posterior cervical area was shaved prepped and draped in the usual sterile manner.    Exposure: After local anesthesia was injected in the incision was then created in order to expose the spinous processes, lamina, facets, and thoracic rib heads from C 2 to T 4.    Stereotaxis: A stereotactic fiducial array was placed onto the spinous process of C2.  The O arm was used in order to obtain a rotational fluoroscopic image that was reconstructed on the separate workstation and that I interpreted myself in order to proceed with stereotactic placement of lateral mass and pedicle screws.  The reconstructed imagery was then transferred into the M_SOLUTION  stereotactic workstation and a preoperative plan was devised and intraoperative navigation used throughout the placement of the instrumentation.    Removal of prior spinal instrumentation: The screws had partially pulled. I removed the screws and rods.    Spinal instrumentation: I identified the lateral masses of  C2, C3, C4, C5, C6 with the stereotactic navigation system and devised a trajectory for placement of lateral mass screws.  Using a guided drill I drilled into the lateral mass and then using a guided screw I placed lateral mass screws into the lateral masses of C2, C3, C4, C5, C6 bilaterally.  I then identified the pedicle of C7, T1, T2, T3,  and in a similar manner using a drill guide and drill that was guided stereotactically I drilled into the pedicles and placed pedicle screws into the vertebral bodies of  C7, T1, T2, T3, T4, T5 bilaterally.    I then shaped a metallic mavis and placed it into the heads of the placed screws and then using the locking nuts I secured the mavis bilaterally into each of the screw heads and tightened these down to the appropriate torque according to 's specifications.  I placed a cross-link as well and tightened this down as well.    Arthrodesis: Using the high-speed drill I decorticated the lateral masses of C2, C3, C4, C5, C6, C7, and the rib head and lamina of T1, T2, T3.  I then placed allograft and autograft bone material over the decorticated portions of the spine in order to induce bony fusion over time.    Closure: Hemostasis was obtained.  Large drains were placed into the epidural space and brought out through separate stab wound incision.  The fascia was then closed and then the rest of the wound closed by my plastic surgical colleague Dr. Wilson.    Halo vest and ring placement:    The patient was then turned supine onto the patient's bed and onto the posterior part of the halo vest.  The halo ring was then placed using local anesthesia and for fixation  pins were used through the skin to attach the halo ring to the patient's skull.  The anterior portion of the vest was then attached to the posterior portion from the 4 posts were attached to the halo ring.    The patient was then awakened from general endotracheal anesthesia and taken to recovery room in stable and good condition.    Ha Guidry MD     Date: 6/22/2017  Time: 11:56 AM

## 2017-06-27 ENCOUNTER — TELEPHONE (OUTPATIENT)
Dept: NEUROSURGERY | Facility: CLINIC | Age: 62
End: 2017-06-27

## 2017-06-27 DIAGNOSIS — T84.9XXA: ICD-10-CM

## 2017-06-27 DIAGNOSIS — Z98.890 POST-OPERATIVE STATE: Primary | ICD-10-CM

## 2017-06-27 NOTE — TELEPHONE ENCOUNTER
KATIE Jaramillo [facility] [phone] (Anette Blackburn)  would like okay for order for PT now that patient is out of halo. OK with this?     Fax order to [phone]

## 2017-07-17 ENCOUNTER — HOSPITAL ENCOUNTER (OUTPATIENT)
Dept: GENERAL RADIOLOGY | Facility: HOSPITAL | Age: 62
Discharge: HOME | End: 2017-07-17
Attending: NEUROLOGICAL SURGERY

## 2017-07-17 ENCOUNTER — HOSPITAL ENCOUNTER (OUTPATIENT)
Dept: CARDIOLOGY | Facility: HOSPITAL | Age: 62
Discharge: HOME | End: 2017-07-17

## 2017-07-17 ENCOUNTER — OFFICE VISIT (OUTPATIENT)
Dept: NEUROSURGERY | Facility: CLINIC | Age: 62
End: 2017-07-17

## 2017-07-17 ENCOUNTER — HOSPITAL ENCOUNTER (INPATIENT)
Facility: HOSPITAL | Age: 62
LOS: 4 days | Discharge: HOME | End: 2017-07-21
Attending: INTERNAL MEDICINE | Admitting: INTERNAL MEDICINE

## 2017-07-17 VITALS
HEART RATE: 68 BPM | DIASTOLIC BLOOD PRESSURE: 86 MMHG | SYSTOLIC BLOOD PRESSURE: 130 MMHG | RESPIRATION RATE: 14 BRPM | WEIGHT: 186 LBS | HEIGHT: 62 IN | BODY MASS INDEX: 34.23 KG/M2

## 2017-07-17 DIAGNOSIS — M79.661 PAIN AND SWELLING OF LOWER LEG, RIGHT: Primary | ICD-10-CM

## 2017-07-17 DIAGNOSIS — R53.1 GENERALIZED WEAKNESS: Primary | ICD-10-CM

## 2017-07-17 DIAGNOSIS — M79.89 PAIN AND SWELLING OF LOWER LEG, RIGHT: Primary | ICD-10-CM

## 2017-07-17 DIAGNOSIS — M43.22 CERVICAL VERTEBRAL FUSION: ICD-10-CM

## 2017-07-17 DIAGNOSIS — Z09 POSTOP CHECK: ICD-10-CM

## 2017-07-17 PROBLEM — IMO0001 PATIENT IS JEHOVAH'S WITNESS: Status: ACTIVE | Noted: 2017-07-17

## 2017-07-17 PROBLEM — I82.411 ACUTE DEEP VEIN THROMBOSIS (DVT) OF FEMORAL VEIN OF RIGHT LOWER EXTREMITY (HCC): Status: ACTIVE | Noted: 2017-07-17

## 2017-07-17 PROBLEM — I82.409 ACUTE DVT (DEEP VENOUS THROMBOSIS) (HCC): Status: ACTIVE | Noted: 2017-07-17

## 2017-07-17 LAB
APTT PPP: 28.8 SECONDS (ref 22.7–35.4)
BASOPHILS # BLD AUTO: 0.02 10*3/MM3 (ref 0–0.2)
BASOPHILS NFR BLD AUTO: 0.2 % (ref 0–1.5)
DEPRECATED RDW RBC AUTO: 49.2 FL (ref 37–54)
EOSINOPHIL # BLD AUTO: 0.15 10*3/MM3 (ref 0–0.7)
EOSINOPHIL NFR BLD AUTO: 1.7 % (ref 0.3–6.2)
ERYTHROCYTE [DISTWIDTH] IN BLOOD BY AUTOMATED COUNT: 14.4 % (ref 11.7–13)
HCT VFR BLD AUTO: 39.5 % (ref 35.6–45.5)
HGB BLD-MCNC: 12.6 G/DL (ref 11.9–15.5)
IMM GRANULOCYTES # BLD: 0.02 10*3/MM3 (ref 0–0.03)
IMM GRANULOCYTES NFR BLD: 0.2 % (ref 0–0.5)
INR PPP: 1.12 (ref 0.9–1.1)
LYMPHOCYTES # BLD AUTO: 1.26 10*3/MM3 (ref 0.9–4.8)
LYMPHOCYTES NFR BLD AUTO: 14.3 % (ref 19.6–45.3)
MCH RBC QN AUTO: 30.4 PG (ref 26.9–32)
MCHC RBC AUTO-ENTMCNC: 31.9 G/DL (ref 32.4–36.3)
MCV RBC AUTO: 95.2 FL (ref 80.5–98.2)
MONOCYTES # BLD AUTO: 1.01 10*3/MM3 (ref 0.2–1.2)
MONOCYTES NFR BLD AUTO: 11.4 % (ref 5–12)
NEUTROPHILS # BLD AUTO: 6.38 10*3/MM3 (ref 1.9–8.1)
NEUTROPHILS NFR BLD AUTO: 72.2 % (ref 42.7–76)
PLATELET # BLD AUTO: 260 10*3/MM3 (ref 140–500)
PMV BLD AUTO: 10.8 FL (ref 6–12)
PROTHROMBIN TIME: 14 SECONDS (ref 11.7–14.2)
RBC # BLD AUTO: 4.15 10*6/MM3 (ref 3.9–5.2)
WBC NRBC COR # BLD: 8.84 10*3/MM3 (ref 4.5–10.7)

## 2017-07-17 PROCEDURE — 72040 X-RAY EXAM NECK SPINE 2-3 VW: CPT

## 2017-07-17 PROCEDURE — 85610 PROTHROMBIN TIME: CPT | Performed by: INTERNAL MEDICINE

## 2017-07-17 PROCEDURE — 93971 EXTREMITY STUDY: CPT

## 2017-07-17 PROCEDURE — G0378 HOSPITAL OBSERVATION PER HR: HCPCS

## 2017-07-17 PROCEDURE — 99024 POSTOP FOLLOW-UP VISIT: CPT | Performed by: NURSE PRACTITIONER

## 2017-07-17 PROCEDURE — 72072 X-RAY EXAM THORAC SPINE 3VWS: CPT

## 2017-07-17 PROCEDURE — 25010000002 HEPARIN (PORCINE) PER 1000 UNITS: Performed by: INTERNAL MEDICINE

## 2017-07-17 PROCEDURE — 85025 COMPLETE CBC W/AUTO DIFF WBC: CPT | Performed by: INTERNAL MEDICINE

## 2017-07-17 PROCEDURE — 85730 THROMBOPLASTIN TIME PARTIAL: CPT | Performed by: INTERNAL MEDICINE

## 2017-07-17 RX ORDER — SODIUM CHLORIDE 0.9 % (FLUSH) 0.9 %
1-10 SYRINGE (ML) INJECTION AS NEEDED
Status: DISCONTINUED | OUTPATIENT
Start: 2017-07-17 | End: 2017-07-21 | Stop reason: HOSPADM

## 2017-07-17 RX ORDER — WARFARIN SODIUM 5 MG/1
5 TABLET ORAL
Status: DISCONTINUED | OUTPATIENT
Start: 2017-07-17 | End: 2017-07-20

## 2017-07-17 RX ORDER — ACETAMINOPHEN 325 MG/1
650 TABLET ORAL EVERY 4 HOURS PRN
Status: DISCONTINUED | OUTPATIENT
Start: 2017-07-17 | End: 2017-07-21 | Stop reason: HOSPADM

## 2017-07-17 RX ORDER — HEPARIN SODIUM 5000 [USP'U]/ML
80 INJECTION, SOLUTION INTRAVENOUS; SUBCUTANEOUS ONCE
Status: COMPLETED | OUTPATIENT
Start: 2017-07-17 | End: 2017-07-17

## 2017-07-17 RX ORDER — HEPARIN SODIUM 5000 [USP'U]/ML
40-80 INJECTION, SOLUTION INTRAVENOUS; SUBCUTANEOUS EVERY 6 HOURS PRN
Status: DISCONTINUED | OUTPATIENT
Start: 2017-07-17 | End: 2017-07-21

## 2017-07-17 RX ORDER — CYCLOBENZAPRINE HCL 10 MG
10 TABLET ORAL 3 TIMES DAILY PRN
Status: DISCONTINUED | OUTPATIENT
Start: 2017-07-17 | End: 2017-07-21 | Stop reason: HOSPADM

## 2017-07-17 RX ORDER — ACETAMINOPHEN 325 MG/1
650 TABLET ORAL EVERY 6 HOURS PRN
Status: DISCONTINUED | OUTPATIENT
Start: 2017-07-17 | End: 2017-07-17 | Stop reason: SDUPTHER

## 2017-07-17 RX ORDER — CYCLOBENZAPRINE HCL 10 MG
10 TABLET ORAL 3 TIMES DAILY PRN
Qty: 30 TABLET | Refills: 0 | Status: SHIPPED | OUTPATIENT
Start: 2017-07-17 | End: 2017-09-29

## 2017-07-17 RX ADMIN — ACETAMINOPHEN 650 MG: 325 TABLET ORAL at 22:04

## 2017-07-17 RX ADMIN — HEPARIN SODIUM 1500 UNITS/HR: 10000 INJECTION, SOLUTION INTRAVENOUS at 21:03

## 2017-07-17 RX ADMIN — HEPARIN SODIUM 6800 UNITS: 5000 INJECTION, SOLUTION INTRAVENOUS; SUBCUTANEOUS at 20:57

## 2017-07-17 RX ADMIN — WARFARIN SODIUM 5 MG: 5 TABLET ORAL at 22:22

## 2017-07-17 NOTE — PROGRESS NOTES
Subjective   Patient ID: Alannah Delgado is a 62 y.o. female is here today for follow-up. She is status post C6 corpectomy with anterior instrumentation at C 4, 5, 7, T1 and posterior cervical decompression and removal of cervical posterior hardware with instrumented fusion from C2 through T3 as well as halo placement on May 3, 2017.  She had xray of the cervical/ thoracic this afternoon. She presents today accompanied by her friend, Jenna.    History of Present Illness   Patient presents for second postoperative visit following C6 corpectomy with anteiror instrumentation from C4 to T7 and posterior cervical decompression and removal of posterior hardware with instrumented fusion from C2 through T3 for chronic neck pain and cervical hardware failure.  Surgery was on May 3, 2017.  She remained in a Halo brace until her last office visit in June.  She continues in a hard cervical collar at this time and has had cervical x-rays since her last office visit.  She complains of discomfort in her scapula bilaterally.  She also reports some right leg swelling and pain behind the knee that began on 7/13/17 when she twisted her knee getting in her car.  She has point tenderness behind the knee.  She states ices helped some but the swelling persist.  She denies any shortness of breath or chest pain.    She is having more neck pain due to discomfort of collar. She denies any wound issues or fevers. She denies numbness and tingling of arms.     The following portions of the patient's history were reviewed and updated as appropriate: allergies, current medications and problem list.    Review of Systems   Constitutional: Negative for fever.   Respiratory: Negative for shortness of breath.    Cardiovascular: Positive for leg swelling (right). Negative for chest pain.   Musculoskeletal: Positive for myalgias (right leg), neck pain and neck stiffness.   Neurological: Positive for weakness (occ). Negative for numbness and headaches.    Psychiatric/Behavioral: Positive for sleep disturbance.       Objective   Physical Exam   Constitutional: She is oriented to person, place, and time. She appears well-developed and well-nourished.   Neck:   Hard cervical collar in place; anterior and posterior cervical incisions healing without redness, drainage, swelling.   Cardiovascular: Intact distal pulses.    Pulmonary/Chest: Effort normal.   Musculoskeletal: She exhibits edema (2+ right lower extremity to knee and 1+ in the posterior thigh).        Right knee: Tenderness (posterior knee) found.        Cervical back: She exhibits tenderness (paraspinous/upper scapular bilaterally).   Neurological: She is alert and oriented to person, place, and time. She has normal strength. Gait normal.   Skin: Skin is warm and dry.   Vitals reviewed.    Neurologic Exam     Mental Status   Oriented to person, place, and time.   Level of consciousness: alert    Motor Exam   Muscle bulk: normal  Overall muscle tone: normal    Strength   Strength 5/5 throughout.     Sensory Exam   Right arm light touch: normal  Left arm light touch: normal    Gait, Coordination, and Reflexes     Gait  Gait: normal      Assessment/Plan   Independent Review of Radiographic Studies:    Cervical thoracic x-rays reviewed with Dr. Huerta.  Postoperative changes as expected.  Instrumentation appears in appropriate position and no complicating features.    Medical Decision Making:    Patient presents for follow-up of extensive cervical surgery.  She is overall doing well.  She doesn't care for her collar, but she reports compliance.  She has not been wearing her bone growth stimulator as directed.  She has some parascapular discomfort.     Her exam is as noted above.  Cervical incisions are well-healed.  There is no strength deficits.  She has significant swelling of the right lower extremity especially from the knee down, but there is some posterior thigh swelling as well.  She has tenderness behind  the right knee to palpation.  She has no pain with ankle range of motion or knee extension.    I'm sending her for a STAT duplex of her right lower extremity to rule out DVT.  If it is positive I'll contact her PCP for treatment direction.  I recommended that she try heat on her upper shoulders and back and I have prescribed a muscle relaxant that she can try she would like.  Otherwise, she will continue in her collar until her next office visit and likely for 1 month further.  I have instructed her to begin her bone growth stimulator now  and wear 4 hours daily as she was previously directed.    Plan: STAT duplex right lower extremity.  Return to office one month with cervical and thoracic x-rays.    Alannah was seen today for neck pain.    Diagnoses and all orders for this visit:    Pain and swelling of lower leg, right  -     Duplex Venous Lower Extremity - Right CAR    Postop check  -     XR Spine Cervical 2 or 3 View; Future  -     XR Spine Thoracic 3 View; Future      Return in about 1 month (around 8/17/2017) for Follow-up with NP.

## 2017-07-17 NOTE — PROGRESS NOTES
[facility] doppler completed.  Preliminary results:  RT leg is positive for new extensive DVT from the prox thigh-mid calf.    Results given to MAICO Huynh at 1715. Patient is being admitted to the hospital by Dr. Macias at 1730. Patient was made aware of DVT and understood she was to be admitted for immediate treatment. She was taken to her room in the CVU at 1845.

## 2017-07-18 PROBLEM — I82.411 ACUTE DEEP VEIN THROMBOSIS (DVT) OF RIGHT FEMORAL VEIN (HCC): Status: ACTIVE | Noted: 2017-07-18

## 2017-07-18 LAB
ALBUMIN SERPL-MCNC: 3.7 G/DL (ref 3.5–5.2)
ALBUMIN/GLOB SERPL: 1.2 G/DL
ALP SERPL-CCNC: 75 U/L (ref 39–117)
ALT SERPL W P-5'-P-CCNC: 28 U/L (ref 1–33)
ANION GAP SERPL CALCULATED.3IONS-SCNC: 15.6 MMOL/L
APTT PPP: 110 SECONDS (ref 22.7–35.4)
APTT PPP: 183.4 SECONDS (ref 22.7–35.4)
APTT PPP: 53.2 SECONDS (ref 22.7–35.4)
AST SERPL-CCNC: 22 U/L (ref 1–32)
BASOPHILS # BLD AUTO: 0.02 10*3/MM3 (ref 0–0.2)
BASOPHILS NFR BLD AUTO: 0.3 % (ref 0–1.5)
BH CV LOW VAS RIGHT DISTAL FEMORAL SPONT: 1
BH CV LOW VAS RIGHT GASTRONEMIUS VESSEL: 1
BH CV LOW VAS RIGHT LESSER SAPH VESSEL: 1
BH CV LOW VAS RIGHT MID FEMORAL SPONT: 1
BH CV LOW VAS RIGHT POPLITEAL SPONT: 1
BH CV LOW VAS RIGHT PROXIMAL FEMORAL SPONT: 1
BH CV LOWER VASCULAR LEFT COMMON FEMORAL AUGMENT: NORMAL
BH CV LOWER VASCULAR LEFT COMMON FEMORAL COMPETENT: NORMAL
BH CV LOWER VASCULAR LEFT COMMON FEMORAL COMPRESS: NORMAL
BH CV LOWER VASCULAR LEFT COMMON FEMORAL PHASIC: NORMAL
BH CV LOWER VASCULAR LEFT COMMON FEMORAL SPONT: NORMAL
BH CV LOWER VASCULAR RIGHT COMMON FEMORAL AUGMENT: NORMAL
BH CV LOWER VASCULAR RIGHT COMMON FEMORAL COMPETENT: NORMAL
BH CV LOWER VASCULAR RIGHT COMMON FEMORAL COMPRESS: NORMAL
BH CV LOWER VASCULAR RIGHT COMMON FEMORAL PHASIC: NORMAL
BH CV LOWER VASCULAR RIGHT COMMON FEMORAL SPONT: NORMAL
BH CV LOWER VASCULAR RIGHT DISTAL FEMORAL AUGMENT: NORMAL
BH CV LOWER VASCULAR RIGHT DISTAL FEMORAL COMPETENT: NORMAL
BH CV LOWER VASCULAR RIGHT DISTAL FEMORAL COMPRESS: NORMAL
BH CV LOWER VASCULAR RIGHT DISTAL FEMORAL PHASIC: NORMAL
BH CV LOWER VASCULAR RIGHT DISTAL FEMORAL SPONT: NORMAL
BH CV LOWER VASCULAR RIGHT DISTAL FEMORAL THROMBUS: NORMAL
BH CV LOWER VASCULAR RIGHT GASTRONEMIUS COMPRESS: NORMAL
BH CV LOWER VASCULAR RIGHT GASTRONEMIUS THROMBUS: NORMAL
BH CV LOWER VASCULAR RIGHT GREATER SAPH AK COMPRESS: NORMAL
BH CV LOWER VASCULAR RIGHT GREATER SAPH BK COMPRESS: NORMAL
BH CV LOWER VASCULAR RIGHT LESSER SAPH COMPRESS: NORMAL
BH CV LOWER VASCULAR RIGHT LESSER SAPH THROMBUS: NORMAL
BH CV LOWER VASCULAR RIGHT MID FEMORAL AUGMENT: NORMAL
BH CV LOWER VASCULAR RIGHT MID FEMORAL COMPETENT: NORMAL
BH CV LOWER VASCULAR RIGHT MID FEMORAL COMPRESS: NORMAL
BH CV LOWER VASCULAR RIGHT MID FEMORAL PHASIC: NORMAL
BH CV LOWER VASCULAR RIGHT MID FEMORAL SPONT: NORMAL
BH CV LOWER VASCULAR RIGHT MID FEMORAL THROMBUS: NORMAL
BH CV LOWER VASCULAR RIGHT PERONEAL COMPRESS: NORMAL
BH CV LOWER VASCULAR RIGHT POPLITEAL AUGMENT: NORMAL
BH CV LOWER VASCULAR RIGHT POPLITEAL COMPETENT: NORMAL
BH CV LOWER VASCULAR RIGHT POPLITEAL COMPRESS: NORMAL
BH CV LOWER VASCULAR RIGHT POPLITEAL PHASIC: NORMAL
BH CV LOWER VASCULAR RIGHT POPLITEAL SPONT: NORMAL
BH CV LOWER VASCULAR RIGHT POPLITEAL THROMBUS: NORMAL
BH CV LOWER VASCULAR RIGHT POSTERIOR TIBIAL COMPRESS: NORMAL
BH CV LOWER VASCULAR RIGHT PROXIMAL FEMORAL AUGMENT: NORMAL
BH CV LOWER VASCULAR RIGHT PROXIMAL FEMORAL COMPETENT: NORMAL
BH CV LOWER VASCULAR RIGHT PROXIMAL FEMORAL COMPRESS: NORMAL
BH CV LOWER VASCULAR RIGHT PROXIMAL FEMORAL PHASIC: NORMAL
BH CV LOWER VASCULAR RIGHT PROXIMAL FEMORAL SPONT: NORMAL
BH CV LOWER VASCULAR RIGHT PROXIMAL FEMORAL THROMBUS: NORMAL
BH CV LOWER VASCULAR RIGHT SAPHENOFEMORAL JUNCTION AUGMENT: NORMAL
BH CV LOWER VASCULAR RIGHT SAPHENOFEMORAL JUNCTION COMPETENT: NORMAL
BH CV LOWER VASCULAR RIGHT SAPHENOFEMORAL JUNCTION COMPRESS: NORMAL
BH CV LOWER VASCULAR RIGHT SAPHENOFEMORAL JUNCTION PHASIC: NORMAL
BH CV LOWER VASCULAR RIGHT SAPHENOFEMORAL JUNCTION SPONT: NORMAL
BILIRUB SERPL-MCNC: 0.8 MG/DL (ref 0.1–1.2)
BUN BLD-MCNC: 10 MG/DL (ref 8–23)
BUN/CREAT SERPL: 16.9 (ref 7–25)
CALCIUM SPEC-SCNC: 9.2 MG/DL (ref 8.6–10.5)
CHLORIDE SERPL-SCNC: 101 MMOL/L (ref 98–107)
CO2 SERPL-SCNC: 21.4 MMOL/L (ref 22–29)
CREAT BLD-MCNC: 0.59 MG/DL (ref 0.57–1)
DEPRECATED RDW RBC AUTO: 49.3 FL (ref 37–54)
EOSINOPHIL # BLD AUTO: 0.25 10*3/MM3 (ref 0–0.7)
EOSINOPHIL NFR BLD AUTO: 3.2 % (ref 0.3–6.2)
ERYTHROCYTE [DISTWIDTH] IN BLOOD BY AUTOMATED COUNT: 14.3 % (ref 11.7–13)
GFR SERPL CREATININE-BSD FRML MDRD: 104 ML/MIN/1.73
GLOBULIN UR ELPH-MCNC: 3.2 GM/DL
GLUCOSE BLD-MCNC: 102 MG/DL (ref 65–99)
HCT VFR BLD AUTO: 37.7 % (ref 35.6–45.5)
HGB BLD-MCNC: 12 G/DL (ref 11.9–15.5)
IMM GRANULOCYTES # BLD: 0 10*3/MM3 (ref 0–0.03)
IMM GRANULOCYTES NFR BLD: 0 % (ref 0–0.5)
INR PPP: 1.15 (ref 0.9–1.1)
INR PPP: 1.32 (ref 0.9–1.1)
LYMPHOCYTES # BLD AUTO: 1.71 10*3/MM3 (ref 0.9–4.8)
LYMPHOCYTES NFR BLD AUTO: 21.8 % (ref 19.6–45.3)
MCH RBC QN AUTO: 30.2 PG (ref 26.9–32)
MCHC RBC AUTO-ENTMCNC: 31.8 G/DL (ref 32.4–36.3)
MCV RBC AUTO: 95 FL (ref 80.5–98.2)
MONOCYTES # BLD AUTO: 0.95 10*3/MM3 (ref 0.2–1.2)
MONOCYTES NFR BLD AUTO: 12.1 % (ref 5–12)
NEUTROPHILS # BLD AUTO: 4.91 10*3/MM3 (ref 1.9–8.1)
NEUTROPHILS NFR BLD AUTO: 62.6 % (ref 42.7–76)
PLATELET # BLD AUTO: 240 10*3/MM3 (ref 140–500)
PMV BLD AUTO: 10.8 FL (ref 6–12)
POTASSIUM BLD-SCNC: 3.5 MMOL/L (ref 3.5–5.2)
PROT SERPL-MCNC: 6.9 G/DL (ref 6–8.5)
PROTHROMBIN TIME: 14.3 SECONDS (ref 11.7–14.2)
PROTHROMBIN TIME: 15.9 SECONDS (ref 11.7–14.2)
RBC # BLD AUTO: 3.97 10*6/MM3 (ref 3.9–5.2)
SODIUM BLD-SCNC: 138 MMOL/L (ref 136–145)
WBC NRBC COR # BLD: 7.84 10*3/MM3 (ref 4.5–10.7)

## 2017-07-18 PROCEDURE — 85610 PROTHROMBIN TIME: CPT | Performed by: INTERNAL MEDICINE

## 2017-07-18 PROCEDURE — 25010000002 HEPARIN (PORCINE) PER 1000 UNITS: Performed by: INTERNAL MEDICINE

## 2017-07-18 PROCEDURE — 85025 COMPLETE CBC W/AUTO DIFF WBC: CPT | Performed by: INTERNAL MEDICINE

## 2017-07-18 PROCEDURE — 80053 COMPREHEN METABOLIC PANEL: CPT | Performed by: INTERNAL MEDICINE

## 2017-07-18 PROCEDURE — 99024 POSTOP FOLLOW-UP VISIT: CPT | Performed by: NURSE PRACTITIONER

## 2017-07-18 PROCEDURE — 85610 PROTHROMBIN TIME: CPT | Performed by: HOSPITALIST

## 2017-07-18 PROCEDURE — 85730 THROMBOPLASTIN TIME PARTIAL: CPT | Performed by: INTERNAL MEDICINE

## 2017-07-18 PROCEDURE — 85730 THROMBOPLASTIN TIME PARTIAL: CPT | Performed by: HOSPITALIST

## 2017-07-18 RX ORDER — OXYCODONE HYDROCHLORIDE AND ACETAMINOPHEN 5; 325 MG/1; MG/1
1 TABLET ORAL ONCE
Status: DISCONTINUED | OUTPATIENT
Start: 2017-07-18 | End: 2017-07-21 | Stop reason: HOSPADM

## 2017-07-18 RX ADMIN — WARFARIN SODIUM 5 MG: 5 TABLET ORAL at 17:24

## 2017-07-18 RX ADMIN — ACETAMINOPHEN 650 MG: 325 TABLET ORAL at 22:11

## 2017-07-18 RX ADMIN — HEPARIN SODIUM 1413 UNITS/HR: 10000 INJECTION, SOLUTION INTRAVENOUS at 21:57

## 2017-07-18 RX ADMIN — HEPARIN SODIUM 6800 UNITS: 5000 INJECTION, SOLUTION INTRAVENOUS; SUBCUTANEOUS at 12:19

## 2017-07-18 RX ADMIN — ACETAMINOPHEN 650 MG: 325 TABLET ORAL at 13:51

## 2017-07-18 NOTE — PLAN OF CARE
Problem: Patient Care Overview (Adult)  Goal: Plan of Care Review  Outcome: Ongoing (interventions implemented as appropriate)    07/18/17 1821   Coping/Psychosocial Response Interventions   Plan Of Care Reviewed With patient   Patient Care Overview   Progress progress towards functional goals is fair   Outcome Evaluation   Outcome Summary/Follow up Plan Strict bedrest with DVT. Pt reporting pain relief from leg. Heparin therapeutic level not yet achieved. Coumadin administered. 7/18/2017 @ 1823       Goal: Adult Individualization and Mutuality  Outcome: Ongoing (interventions implemented as appropriate)    07/18/17 1821   Individualization   Patient Specific Preferences none at this time         Problem: VTE, DVT and PE (Adult)  Goal: Signs and Symptoms of Listed Potential Problems Will be Absent or Manageable (VTE, DVT and PE)  Outcome: Ongoing (interventions implemented as appropriate)    07/18/17 1821   VTE, DVT and PE   Problems Assessed (VTE, DVT, PE) all   Problems Present (VTE, DVT, PE) deep vein thrombosis

## 2017-07-18 NOTE — PROGRESS NOTES
"DAILY PROGRESS NOTE  [facility]    Patient Identification:  Name: Alannah Delgado  Age: 62 y.o.  Sex: female  :  1955  MRN: [id number]         Primary Care Physician: Jc Reardon MD      Subjective  No new c/o.   No CP.  No SOA.     Objective:  General Appearance:  Comfortable, well-appearing, in no acute distress and not in pain.    Vital signs: (most recent): Blood pressure 125/88, pulse 95, temperature 98.2 °F (36.8 °C), temperature source Oral, resp. rate 16, height 62\" (157.5 cm), weight 186 lb (84.4 kg), SpO2 98 %.    Lungs:  Normal respiratory rate and normal effort.  Breath sounds clear to auscultation.    Heart: Normal rate.  Regular rhythm.    Extremities: There is dependent edema (RLE).    Neurological: Patient is alert and oriented to person, place and time.    Skin:  Warm and dry.                Vital signs in last 24 hours:  Temp:  [97.8 °F (36.6 °C)-98.2 °F (36.8 °C)] 98.2 °F (36.8 °C)  Heart Rate:  [81-95] 95  Resp:  [16] 16  BP: (120-128)/(77-88) 125/88    Intake/Output:    Intake/Output Summary (Last 24 hours) at 17  Last data filed at 17 1733   Gross per 24 hour   Intake             1380 ml   Output               75 ml   Net             1305 ml           Results from last 7 days  Lab Units 17  1938   WBC 10*3/mm3 7.84 8.84   HEMOGLOBIN g/dL 12.0 12.6   PLATELETS 10*3/mm3 240 260     Results from last 7 days  Lab Units 17  0313   SODIUM mmol/L 138   POTASSIUM mmol/L 3.5   CHLORIDE mmol/L 101   CO2 mmol/L 21.4*   BUN mg/dL 10   CREATININE mg/dL 0.59   GLUCOSE mg/dL 102*   Estimated Creatinine Clearance: 100.9 mL/min (by C-G formula based on Cr of 0.59).  Results from last 7 days  Lab Units 17  0313   CALCIUM mg/dL 9.2   ALBUMIN g/dL 3.70     Results from last 7 days  Lab Units 17  0313   ALBUMIN g/dL 3.70   BILIRUBIN mg/dL 0.8   ALK PHOS U/L 75   AST (SGOT) U/L 22   ALT (SGPT) U/L 28       Assessment:  Principal " Problem:    Acute deep vein thrombosis (DVT) of femoral vein of right lower extremity  Active Problems:    C5 on 6 anterolisthesis    Cervical vertebral fusion    Patient is Religious    Acute DVT (deep venous thrombosis)    Acute deep vein thrombosis (DVT) of right femoral vein      Plan:  Cont w Hep drip w transition to Coumadin.  OK to increase activity and trans to med/surg from my of view.  ECS.     Jarocho Bass MD  7/18/2017  7:53 PM

## 2017-07-18 NOTE — PLAN OF CARE
Problem: Patient Care Overview (Adult)  Goal: Plan of Care Review  Outcome: Ongoing (interventions implemented as appropriate)    07/18/17 0514   Coping/Psychosocial Response Interventions   Plan Of Care Reviewed With patient   Patient Care Overview   Progress progress towards functional goals is fair   Outcome Evaluation   Outcome Summary/Follow up Plan Patient on heparin drip for bridging. Coumadin given. Patient on strict bedrest per DVT. Mild o/o pain in r leg, red and swollen. Tylenol given for pain relief.       Goal: Adult Individualization and Mutuality  Outcome: Ongoing (interventions implemented as appropriate)  Goal: Discharge Needs Assessment  Outcome: Ongoing (interventions implemented as appropriate)    Problem: VTE, DVT and PE (Adult)  Goal: Signs and Symptoms of Listed Potential Problems Will be Absent or Manageable (VTE, DVT and PE)  Outcome: Ongoing (interventions implemented as appropriate)

## 2017-07-18 NOTE — H&P
Internal medicine history and physical    INTERNAL MEDICINE   [facility]       Patient Identification:  Name: Alannah Delgado  Age: 62 y.o.  Sex: female  :  1955  MRN: [id number]                   Primary Care Physician: Jc Reardon MD                                   Chief Complaint:  Progressive pain and discomfort in the right leg since Friday afternoon after pedicure    History of Present Illness:   Patient is a 62-year-old female who has past medical history remarkable for cervical spine spondylolisthesis requiring instrumentation and surgery as detailed in neurosurgery note on 5/3/2017.  She is progressing well from her surgery standpoint and went to a pedicure on Friday afternoon.  According to her she had to sit there for R's and when she got up she thought that she has twisted her right leg.  She's been complaining of increasing pain and discomfort in the right leg since then and otherwise feeling fine.  She denies any shortness of breath chest pain hemoptysis or dyspnea on exertion.  Today she had a routine follow-up appointment with neurosurgery service after her neck surgery on May 3, 2017 and improved and she mentioned that she has pain and discomfort in the back of her right knee.  She also told the caregiver at the clinic that she has been using ice on the right knee to take care of the pain.  Because of that suspicion of blood clot was raised and patient was sent to vascular surgery service which showed acute DVT involving the right leg extending into the femoral vein.  Patient is being admitted for further care.  Patient is Taoist and it was recommended by neurosurgery service that she should not be put on normal oral anticoagulation therapy.      Past Medical History:  Past Medical History:   Diagnosis Date   • History of radiation therapy    • Limited joint range of motion     NECK   • Migraine    • Neck mass     benign   history of   • Neck pain    •  Patient is [employer]      Past Surgical History:  Past Surgical History:   Procedure Laterality Date   • ANTERIOR CERVICAL DISCECTOMY W/ FUSION Right 5/3/2017    Procedure: C6 corpectomy and anterior instrumentation.  ;  Surgeon: Ha Guidry MD;  Location: [facility];  Service:    • CERVICAL DISCECTOMY POSTERIOR FUSION WITH BRAIN LAB N/A 5/3/2017    Procedure: Posterior cervical decompression and removal of cervical posterior hardware.  Posterior cervical fusion C2 through T3-instrumented with mastergraft.  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  0 7/17/2017 at Unknown time   • cyclobenzaprine (FLEXERIL) 10 MG tablet Take 1 tablet by mouth 3 (Three) Times a Day As Needed for Muscle Spasms. 30 tablet 0 Past Month at Unknown time     Current Meds:     Current Facility-Administered Medications:   •  acetaminophen (TYLENOL) tablet 650 mg, 650 mg, Oral, Q6H PRN, Chevy Macias MD  •  heparin (porcine) 5000 UNIT/ML injection 3,400-6,800 Units, 40-80 Units/kg, Intravenous, Q6H PRN, Chevy Macias MD  •  heparin 82933 units/250 mL (100 units/mL) in 0.45 % NaCl infusion, 1,500 Units/hr, Intravenous, Titrated, Chevy Macias MD, Last Rate: 15 mL/hr at 07/17/17 2103, 1,500 Units/hr at 07/17/17 2103  Allergies:  Allergies   Allergen Reactions   • Penicillins      Possible convulsions, CHILDHOOD ALLERGY   • Morphine And Related Nausea And Vomiting      And disorientation  "    Social History:   Social History   Substance Use Topics   • Smoking status: Never Smoker   • Smokeless tobacco: Never Used   • Alcohol use No      Family History:  Family History   Problem Relation Age of Onset   • Heart disease Mother    • Diabetes Father    • Heart disease Father    • Stroke Father    • Stroke Sister    • Cancer Brother           Review of Systems  See history of present illness and past medical history.   Constitutional: Negative for fever.   Respiratory: Negative for shortness of breath.   Cardiovascular: Positive for leg swelling (right). Negative for chest pain.   Musculoskeletal: Positive for myalgias (right leg), neck pain and neck stiffness.   Neurological: Positive for weakness (occ). Negative for numbness and headaches.   Psychiatric/Behavioral: Positive for sleep disturbance.     Vitals:   /84 (BP Location: Right arm, Patient Position: Lying)  Pulse 93  Temp 98.9 °F (37.2 °C) (Oral)   Resp 16  Ht 62\" (157.5 cm)  Wt 186 lb (84.4 kg)  SpO2 99%  BMI 34.02 kg/m2  I/O: No intake or output data in the 24 hours ending 07/17/17 2126  Exam:  General Appearance:    Alert, cooperative, no distress, appears stated age   Head:    Normocephalic, without obvious abnormality, atraumatic   Eyes:    PERRL, conjunctiva/corneas clear, EOM's intact, both eyes   Ears:    Normal external ear canals, both ears   Nose:   Nares normal, septum midline, mucosa normal, no drainage    or sinus tenderness   Throat:   Lips, tongue, gums normal; oral mucosa pink and moist   Neck:   Neck is supported by his hard cervical collar    Back:     Symmetric, no curvature, ROM normal, no CVA tenderness   Lungs:     Clear to auscultation bilaterally, respirations unlabored   Chest Wall:    No tenderness or deformity    Heart:    Regular rate and rhythm, S1 and S2 normal, no murmur, rub   or gallop   Abdomen:     Soft, non-tender, bowel sounds active all four quadrants,     no masses, no hepatomegaly, no " splenomegaly   Extremities:   Right lower extremity is swollen and tender more prominently in the cuff area    Pulses:   Pulses palpable in all extremities; symmetric all extremities   Skin:   Skin color normal, Skin is warm and dry,  no rashes or palpable lesions   Neurologic:   CNII-XII intact, motor strength grossly intact, sensation grossly intact to light touch, no focal deficits noted       Data Review:      I reviewed the patient's new clinical results.    Results from last 7 days  Lab Units 07/17/17  1938   WBC 10*3/mm3 8.84   HEMOGLOBIN g/dL 12.6   PLATELETS 10*3/mm3 260       preliminary report on right lower extremity venous Doppler shows acute deep venous thrombosis of the leg extending into the right femoral vein.  Official report pending.    Assessment:  Active Hospital Problems (** Indicates Principal Problem)    Diagnosis Date Noted   • **Acute deep vein thrombosis (DVT) of femoral vein of right lower extremity [I82.411] 07/17/2017   • Patient is [employer] [Z78.9] 07/17/2017   • Cervical vertebral fusion [M43.22] 05/11/2017   • C5 on 6 anterolisthesis [M43.10] 11/15/2016      Resolved Hospital Problems    Diagnosis Date Noted Date Resolved   No resolved problems to display.       Plan:  Admit the patient, provide him with bedrest, start her on IV heparin after basic sets of labs, and transition to either bridge Lovenox with Coumadin or continue on IV heparin with Coumadin until INR is therapeutic.  Because of recent neck surgery and her being Anglican is requested by neurosurgery service that she should not be placed on novel oral anticoagulation therapy.    Chevy Macias MD   7/17/2017  9:26 PM  Much of this encounter note is an electronic transcription/translation of spoken language to printed text. The electronic translation of spoken language may permit erroneous, or at times, nonsensical words or phrases to be inadvertently transcribed; Although I have reviewed the note for such  errors, some may still exist

## 2017-07-19 LAB
APTT PPP: 116.3 SECONDS (ref 22.7–35.4)
APTT PPP: 62.9 SECONDS (ref 22.7–35.4)
APTT PPP: 96.5 SECONDS (ref 22.7–35.4)
BASOPHILS # BLD AUTO: 0.01 10*3/MM3 (ref 0–0.2)
BASOPHILS NFR BLD AUTO: 0.2 % (ref 0–1.5)
DEPRECATED RDW RBC AUTO: 51.3 FL (ref 37–54)
EOSINOPHIL # BLD AUTO: 0.18 10*3/MM3 (ref 0–0.7)
EOSINOPHIL NFR BLD AUTO: 2.9 % (ref 0.3–6.2)
ERYTHROCYTE [DISTWIDTH] IN BLOOD BY AUTOMATED COUNT: 14.5 % (ref 11.7–13)
HCT VFR BLD AUTO: 38.4 % (ref 35.6–45.5)
HGB BLD-MCNC: 12 G/DL (ref 11.9–15.5)
IMM GRANULOCYTES # BLD: 0 10*3/MM3 (ref 0–0.03)
IMM GRANULOCYTES NFR BLD: 0 % (ref 0–0.5)
INR PPP: 1.38 (ref 0.9–1.1)
LYMPHOCYTES # BLD AUTO: 1.3 10*3/MM3 (ref 0.9–4.8)
LYMPHOCYTES NFR BLD AUTO: 20.9 % (ref 19.6–45.3)
MCH RBC QN AUTO: 30.2 PG (ref 26.9–32)
MCHC RBC AUTO-ENTMCNC: 31.3 G/DL (ref 32.4–36.3)
MCV RBC AUTO: 96.7 FL (ref 80.5–98.2)
MONOCYTES # BLD AUTO: 0.56 10*3/MM3 (ref 0.2–1.2)
MONOCYTES NFR BLD AUTO: 9 % (ref 5–12)
NEUTROPHILS # BLD AUTO: 4.17 10*3/MM3 (ref 1.9–8.1)
NEUTROPHILS NFR BLD AUTO: 67 % (ref 42.7–76)
PLATELET # BLD AUTO: 238 10*3/MM3 (ref 140–500)
PMV BLD AUTO: 10.4 FL (ref 6–12)
PROTHROMBIN TIME: 16.4 SECONDS (ref 11.7–14.2)
RBC # BLD AUTO: 3.97 10*6/MM3 (ref 3.9–5.2)
WBC NRBC COR # BLD: 6.22 10*3/MM3 (ref 4.5–10.7)

## 2017-07-19 PROCEDURE — 85025 COMPLETE CBC W/AUTO DIFF WBC: CPT | Performed by: INTERNAL MEDICINE

## 2017-07-19 PROCEDURE — 85730 THROMBOPLASTIN TIME PARTIAL: CPT | Performed by: HOSPITALIST

## 2017-07-19 PROCEDURE — 25010000002 HEPARIN (PORCINE) PER 1000 UNITS: Performed by: INTERNAL MEDICINE

## 2017-07-19 PROCEDURE — 85610 PROTHROMBIN TIME: CPT | Performed by: HOSPITALIST

## 2017-07-19 RX ADMIN — HEPARIN SODIUM 1250 UNITS/HR: 10000 INJECTION, SOLUTION INTRAVENOUS at 06:33

## 2017-07-19 RX ADMIN — ACETAMINOPHEN 650 MG: 325 TABLET ORAL at 20:10

## 2017-07-19 RX ADMIN — WARFARIN SODIUM 5 MG: 5 TABLET ORAL at 17:20

## 2017-07-19 RX ADMIN — HEPARIN SODIUM 3500 UNITS: 5000 INJECTION, SOLUTION INTRAVENOUS; SUBCUTANEOUS at 20:58

## 2017-07-19 NOTE — PLAN OF CARE
Problem: Patient Care Overview (Adult)  Goal: Plan of Care Review  Outcome: Ongoing (interventions implemented as appropriate)    07/19/17 1505   Coping/Psychosocial Response Interventions   Plan Of Care Reviewed With patient   Patient Care Overview   Progress improving   Outcome Evaluation   Outcome Summary/Follow up Plan Heparin gtt started. Coumadin ordered tonight.        Goal: Adult Individualization and Mutuality  Outcome: Ongoing (interventions implemented as appropriate)  Goal: Discharge Needs Assessment  Outcome: Ongoing (interventions implemented as appropriate)    Problem: VTE, DVT and PE (Adult)  Goal: Signs and Symptoms of Listed Potential Problems Will be Absent or Manageable (VTE, DVT and PE)  Outcome: Ongoing (interventions implemented as appropriate)    07/19/17 1505   VTE, DVT and PE   Problems Assessed (VTE, DVT, PE) all   Problems Present (VTE, DVT, PE) none

## 2017-07-19 NOTE — PAYOR COMM NOTE
"Amiarh Cuadra (62 y.o. Female)            ATTENTION; NURSE REVIEW, AUTH PENDING [id number],  PT HAD OBSERVATION STATUS ORDER STARTING           @ 2135, CONVERTED TO INPT , REPLY TO UR DEPT ,BRANDI LYNCH N  OR UR FAX          [phone]       Date of Birth Social Security Number Address Home Phone MRN    1955  [address]  [place] [address] [phone] [id number]    Hinduism Marital Status          Unknown        Admission Date Admission Type Admitting Provider Attending Provider Department, Room/Bed    17 Urgent Chevy Macias MD Broaddus, Jarocho GAN MD [facility] CARDIOVASC UNIT,     Discharge Date Discharge Disposition Discharge Destination                      Attending Provider: Jarocho Bass MD     Allergies:  Penicillins, Morphine And Related    Isolation:  None   Infection:  None   Code Status:  FULL    Ht:  62\" (157.5 cm)   Wt:  186 lb (84.4 kg)    Admission Cmt:  None   Principal Problem:  Acute deep vein thrombosis (DVT) of femoral vein of right lower extremity [I82.411]                 Active Insurance as of 2017     Primary Coverage     Payor Plan Insurance Group Employer/Plan Group    ANTH MEDICAID [facility] MEDICAID [id number]     Payor Plan Address Payor Plan Phone Number Effective From Effective To    [address] [address] [phone] 2014     [place] [address]       Subscriber Name Subscriber Birth Date Member ID       AMIRAH CUADRA 1955 [id number]                 Emergency Contacts      (Rel.) Home Phone Work Phone Mobile Phone    KelvinMarie (Other) [phone] -- --               History & Physical      Chevy Macias MD at 2017  9:26 PM          Internal medicine history and physical    INTERNAL MEDICINE   [facility]       Patient Identification:  Name: Amirah Cuadra  Age: 62 y.o.  Sex: female  :  1955  MRN: [id number]                 "   Primary Care Physician: Jc Reardon MD                                   Chief Complaint:  Progressive pain and discomfort in the right leg since Friday afternoon after pedicure    History of Present Illness:   Patient is a 62-year-old female who has past medical history remarkable for cervical spine spondylolisthesis requiring instrumentation and surgery as detailed in neurosurgery note on 5/3/2017.  She is progressing well from her surgery standpoint and went to a pedicure on Friday afternoon.  According to her she had to sit there for R's and when she got up she thought that she has twisted her right leg.  She's been complaining of increasing pain and discomfort in the right leg since then and otherwise feeling fine.  She denies any shortness of breath chest pain hemoptysis or dyspnea on exertion.  Today she had a routine follow-up appointment with neurosurgery service after her neck surgery on May 3, 2017 and improved and she mentioned that she has pain and discomfort in the back of her right knee.  She also told the caregiver at the clinic that she has been using ice on the right knee to take care of the pain.  Because of that suspicion of blood clot was raised and patient was sent to vascular surgery service which showed acute DVT involving the right leg extending into the femoral vein.  Patient is being admitted for further care.  Patient is Shinto and it was recommended by neurosurgery service that she should not be put on normal oral anticoagulation therapy.      Past Medical History:  Past Medical History:   Diagnosis Date   • History of radiation therapy    • Limited joint range of motion     NECK   • Migraine    • Neck mass     benign   history of   • Neck pain    • Patient is Shinto      Past Surgical History:  Past Surgical History:   Procedure Laterality Date   • ANTERIOR CERVICAL DISCECTOMY W/ FUSION Right 5/3/2017    Procedure: C6 corpectomy and anterior instrumentation.   ;  Surgeon: Ha Guidry MD;  Location: [facility] OR;  Service:    • CERVICAL DISCECTOMY POSTERIOR FUSION WITH BRAIN LAB N/A 5/3/2017    Procedure: Posterior cervical decompression and removal of cervical posterior hardware.  Posterior cervical fusion C2 through T3-instrumented with mastergraft.  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  0 7/17/2017 at Unknown time   • cyclobenzaprine (FLEXERIL) 10 MG tablet Take 1 tablet by mouth 3 (Three) Times a Day As Needed for Muscle Spasms. 30 tablet 0 Past Month at Unknown time     Current Meds:     Current Facility-Administered Medications:   •  acetaminophen (TYLENOL) tablet 650 mg, 650 mg, Oral, Q6H PRN, Chevy Macias MD  •  heparin (porcine) 5000 UNIT/ML injection 3,400-6,800 Units, 40-80 Units/kg, Intravenous, Q6H PRN, Chevy Macias MD  •  heparin 49405 units/250 mL (100 units/mL) in 0.45 % NaCl infusion, 1,500 Units/hr, Intravenous, Titrated, Chevy Macias MD, Last Rate: 15 mL/hr at 07/17/17 2103, 1,500 Units/hr at 07/17/17 2103  Allergies:  Allergies   Allergen Reactions   • Penicillins      Possible convulsions, CHILDHOOD ALLERGY   • Morphine And Related Nausea And Vomiting      And disorientation     Social History:   Social History   Substance Use Topics   • Smoking status: Never Smoker   • Smokeless tobacco: Never Used   • Alcohol use No      Family History:  Family History   Problem Relation Age of Onset   • Heart  "disease Mother    • Diabetes Father    • Heart disease Father    • Stroke Father    • Stroke Sister    • Cancer Brother           Review of Systems  See history of present illness and past medical history.   Constitutional: Negative for fever.   Respiratory: Negative for shortness of breath.   Cardiovascular: Positive for leg swelling (right). Negative for chest pain.   Musculoskeletal: Positive for myalgias (right leg), neck pain and neck stiffness.   Neurological: Positive for weakness (occ). Negative for numbness and headaches.   Psychiatric/Behavioral: Positive for sleep disturbance.     Vitals:   /84 (BP Location: Right arm, Patient Position: Lying)  Pulse 93  Temp 98.9 °F (37.2 °C) (Oral)   Resp 16  Ht 62\" (157.5 cm)  Wt 186 lb (84.4 kg)  SpO2 99%  BMI 34.02 kg/m2  I/O: No intake or output data in the 24 hours ending 07/17/17 2126  Exam:  General Appearance:    Alert, cooperative, no distress, appears stated age   Head:    Normocephalic, without obvious abnormality, atraumatic   Eyes:    PERRL, conjunctiva/corneas clear, EOM's intact, both eyes   Ears:    Normal external ear canals, both ears   Nose:   Nares normal, septum midline, mucosa normal, no drainage    or sinus tenderness   Throat:   Lips, tongue, gums normal; oral mucosa pink and moist   Neck:   Neck is supported by his hard cervical collar    Back:     Symmetric, no curvature, ROM normal, no CVA tenderness   Lungs:     Clear to auscultation bilaterally, respirations unlabored   Chest Wall:    No tenderness or deformity    Heart:    Regular rate and rhythm, S1 and S2 normal, no murmur, rub   or gallop   Abdomen:     Soft, non-tender, bowel sounds active all four quadrants,     no masses, no hepatomegaly, no splenomegaly   Extremities:   Right lower extremity is swollen and tender more prominently in the cuff area    Pulses:   Pulses palpable in all extremities; symmetric all extremities   Skin:   Skin color normal, Skin is warm and dry,  " no rashes or palpable lesions   Neurologic:   CNII-XII intact, motor strength grossly intact, sensation grossly intact to light touch, no focal deficits noted       Data Review:      I reviewed the patient's new clinical results.    Results from last 7 days  Lab Units 07/17/17 1938   WBC 10*3/mm3 8.84   HEMOGLOBIN g/dL 12.6   PLATELETS 10*3/mm3 260       preliminary report on right lower extremity venous Doppler shows acute deep venous thrombosis of the leg extending into the right femoral vein.  Official report pending.    Assessment:  Active Hospital Problems (** Indicates Principal Problem)    Diagnosis Date Noted   • **Acute deep vein thrombosis (DVT) of femoral vein of right lower extremity [I82.411] 07/17/2017   • Patient is Jew [Z78.9] 07/17/2017   • Cervical vertebral fusion [M43.22] 05/11/2017   • C5 on 6 anterolisthesis [M43.10] 11/15/2016      Resolved Hospital Problems    Diagnosis Date Noted Date Resolved   No resolved problems to display.       Plan:  Admit the patient, provide him with bedrest, start her on IV heparin after basic sets of labs, and transition to either bridge Lovenox with Coumadin or continue on IV heparin with Coumadin until INR is therapeutic.  Because of recent neck surgery and her being Jew is requested by neurosurgery service that she should not be placed on novel oral anticoagulation therapy.    Chevy Macias MD   7/17/2017  9:26 PM  Much of this encounter note is an electronic transcription/translation of spoken language to printed text. The electronic translation of spoken language may permit erroneous, or at times, nonsensical words or phrases to be inadvertently transcribed; Although I have reviewed the note for such errors, some may still exist       Electronically signed by Chevy Macias MD at 7/17/2017  9:33 PM             Lines, Drains & Airways    Active LDAs     Name:   Placement date:   Placement time:   Site:   Days:    Peripheral IV Line -  Single Lumen 07/17/17 1945 metacarpal vein (top of hand), right 22 gauge  07/17/17 1945      1         Inactive LDAs     None                Hospital Medications (all)       Dose Frequency Start End    acetaminophen (TYLENOL) tablet 650 mg 650 mg Every 4 Hours PRN 7/17/2017     Sig - Route: Take 2 tablets by mouth Every 4 (Four) Hours As Needed for Mild Pain . - Oral    cyclobenzaprine (FLEXERIL) tablet 10 mg 10 mg 3 Times Daily PRN 7/17/2017     Sig - Route: Take 1 tablet by mouth 3 (Three) Times a Day As Needed for Muscle Spasms. - Oral    heparin (porcine) 5000 UNIT/ML injection 3,400-6,800 Units 40-80 Units/kg × 84.4 kg Every 6 Hours PRN 7/17/2017     Sig - Route: Infuse 0.68-1.36 mL into a venous catheter Every 6 (Six) Hours As Needed (Per Heparin Nomogram). - Intravenous    heparin (porcine) 5000 UNIT/ML injection 6,800 Units 80 Units/kg × 84.4 kg Once 7/17/2017 7/17/2017    Sig - Route: Infuse 1.36 mL into a venous catheter 1 (One) Time. - Intravenous    heparin 81208 units/250 mL (100 units/mL) in 0.45 % NaCl infusion 1,500 Units/hr Titrated 7/17/2017     Sig - Route: Infuse 1,500 Units/hr into a venous catheter Dose Adjusted By Provider As Needed. - Intravenous    oxyCODONE-acetaminophen (PERCOCET) 5-325 MG per tablet 1 tablet 1 tablet Once 7/18/2017     Sig - Route: Take 1 tablet by mouth 1 (One) Time. - Oral    sodium chloride 0.9 % flush 1-10 mL 1-10 mL As Needed 7/17/2017     Sig - Route: Infuse 1-10 mL into a venous catheter As Needed for Line Care. - Intravenous    warfarin (COUMADIN) tablet 5 mg 5 mg Daily Warfarin 7/17/2017     Sig - Route: Take 1 tablet by mouth Daily. - Oral    acetaminophen (TYLENOL) tablet 650 mg (Discontinued) 650 mg Every 6 Hours PRN 7/17/2017 7/17/2017    Sig - Route: Take 2 tablets by mouth Every 6 (Six) Hours As Needed for Mild Pain . - Oral    Reason for Discontinue: Duplicate order             Physician Progress Notes (last 24 hours) (Notes from 7/18/2017 12:05 PM  through 7/19/2017 12:05 PM)      MAICO Olivera at 7/18/2017  4:56 PM  Version 1 of 1    Attestation signed by Gordon Huerta IV, MD at 7/18/2017  8:06 PM        I have reviewed the documentation above and agree.  Very fragile bone.  Recommend easily reversible AC given ever present potential for complication.                                   [facility] PROGRESS NOTE    PATIENT IDENTIFICATION:   Name:  Alannah Delgado      MRN:  [id number]     62 y.o.  female               CC:hx of anterior & posterior cervical decompression on 5/3 s/p HALO with acute R LE DVTs       Subjective     Interval History: has complaints of R LE tenderness and swelling, minimal HA.    Objective     Vital signs in last 24 hours:  Temp:  [97.8 °F (36.6 °C)-98.9 °F (37.2 °C)] 98 °F (36.7 °C)  Heart Rate:  [81-93] 91  Resp:  [16] 16  BP: (120-137)/(77-84) 128/81  ICP ranges-    Intake/Output last 3 shifts:  I/O last 3 completed shifts:  In: -   Out: 75 [Urine:75]    Intake/Output this shift:  I/O this shift:  In: 1140 [P.O.:1140]  Out: -       Physical Exam:  General:   Awake, alert, and oriented x 3. NAD  Severely edematous RLE with tenderness and erythema to palpation  CN III IV VI: Extraocular movements are full , PERRL   CN V: Normal facial sensation and strength of muscles of mastication.  CN VII: Facial movements are symmetric. No weakness.  Wearing well fitting cervical hard collar    Motor: Normal muscle strength, bulk and tone in upper and lower extremities.  No fasciculations, rigidity, spasticity, or abnormal movements.  Reflexes: 2+ in the upper and lower extremities. Plantar responses are flexor.  Coordination: Finger to nose test shows no dysmetria.  Rapid alternating movements are normal.  Heel to shin normal.  Extremities:  Patient is wearing AMMY and SCD bilaterally    LABS:    Lab Results (last 24 hours)     Procedure Component Value Units Date/Time    aPTT [[id number]]  (Normal) Collected:   07/17/17 1938    Specimen:  Blood Updated:  07/17/17 2040     PTT 28.8 seconds     CBC & Differential [[id number]] Collected:  07/17/17 1938    Specimen:  Blood Updated:  07/17/17 2009    Narrative:       The following orders were created for panel order CBC & Differential.  Procedure                               Abnormality         Status                     ---------                               -----------         ------                     CBC Auto Differential[[id number]]        Abnormal            Final result                 Please view results for these tests on the individual orders.    Protime-INR [[id number]]  (Abnormal) Collected:  07/17/17 1938    Specimen:  Blood Updated:  07/17/17 2040     Protime 14.0 Seconds      INR 1.12 (H)    CBC Auto Differential [[id number]]  (Abnormal) Collected:  07/17/17 1938    Specimen:  Blood Updated:  07/17/17 2009     WBC 8.84 10*3/mm3      RBC 4.15 10*6/mm3      Hemoglobin 12.6 g/dL      Hematocrit 39.5 %      MCV 95.2 fL      MCH 30.4 pg      MCHC 31.9 (L) g/dL      RDW 14.4 (H) %      RDW-SD 49.2 fl      MPV 10.8 fL      Platelets 260 10*3/mm3      Neutrophil % 72.2 %      Lymphocyte % 14.3 (L) %      Monocyte % 11.4 %      Eosinophil % 1.7 %      Basophil % 0.2 %      Immature Grans % 0.2 %      Neutrophils, Absolute 6.38 10*3/mm3      Lymphocytes, Absolute 1.26 10*3/mm3      Monocytes, Absolute 1.01 10*3/mm3      Eosinophils, Absolute 0.15 10*3/mm3      Basophils, Absolute 0.02 10*3/mm3      Immature Grans, Absolute 0.02 10*3/mm3     aPTT [[id number]]  (Abnormal) Collected:  07/18/17 0312    Specimen:  Blood Updated:  07/18/17 0405     .0 (H) seconds     CBC & Differential [[id number]] Collected:  07/18/17 0312    Specimen:  Blood Updated:  07/18/17 0335    Narrative:       The following orders were created for panel order CBC & Differential.  Procedure                               Abnormality         Status                     ---------                                -----------         ------                     CBC Auto Differential[[id number]]        Abnormal            Final result                 Please view results for these tests on the individual orders.    Protime-INR [[id number]]  (Abnormal) Collected:  07/18/17 0312    Specimen:  Blood Updated:  07/18/17 0405     Protime 14.3 (H) Seconds      INR 1.15 (H)    CBC Auto Differential [[id number]]  (Abnormal) Collected:  07/18/17 0312    Specimen:  Blood Updated:  07/18/17 0335     WBC 7.84 10*3/mm3      RBC 3.97 10*6/mm3      Hemoglobin 12.0 g/dL      Hematocrit 37.7 %      MCV 95.0 fL      MCH 30.2 pg      MCHC 31.8 (L) g/dL      RDW 14.3 (H) %      RDW-SD 49.3 fl      MPV 10.8 fL      Platelets 240 10*3/mm3      Neutrophil % 62.6 %      Lymphocyte % 21.8 %      Monocyte % 12.1 (H) %      Eosinophil % 3.2 %      Basophil % 0.3 %      Immature Grans % 0.0 %      Neutrophils, Absolute 4.91 10*3/mm3      Lymphocytes, Absolute 1.71 10*3/mm3      Monocytes, Absolute 0.95 10*3/mm3      Eosinophils, Absolute 0.25 10*3/mm3      Basophils, Absolute 0.02 10*3/mm3      Immature Grans, Absolute 0.00 10*3/mm3     Comprehensive Metabolic Panel [[id number]]  (Abnormal) Collected:  07/18/17 0313    Specimen:  Blood Updated:  07/18/17 0405     Glucose 102 (H) mg/dL      BUN 10 mg/dL      Creatinine 0.59 mg/dL      Sodium 138 mmol/L      Potassium 3.5 mmol/L      Chloride 101 mmol/L      CO2 21.4 (L) mmol/L      Calcium 9.2 mg/dL      Total Protein 6.9 g/dL      Albumin 3.70 g/dL      ALT (SGPT) 28 U/L      AST (SGOT) 22 U/L      Alkaline Phosphatase 75 U/L      Total Bilirubin 0.8 mg/dL      eGFR Non African Amer 104 mL/min/1.73      Globulin 3.2 gm/dL      A/G Ratio 1.2 g/dL      BUN/Creatinine Ratio 16.9     Anion Gap 15.6 mmol/L     aPTT [[id number]]  (Abnormal) Collected:  07/18/17 0926    Specimen:  Blood Updated:  07/18/17 1041     PTT 53.2 (H) seconds           IMAGING STUDIES:    No new imaging      Meds reviewed/changed:  Yes    Current Facility-Administered Medications   Medication Dose Route Frequency Provider Last Rate Last Dose   • acetaminophen (TYLENOL) tablet 650 mg  650 mg Oral Q4H PRN Chevy Macias MD   650 mg at 07/18/17 1351   • cyclobenzaprine (FLEXERIL) tablet 10 mg  10 mg Oral TID PRN Chevy Macias MD       • heparin (porcine) 5000 UNIT/ML injection 3,400-6,800 Units  40-80 Units/kg Intravenous Q6H PRN Chevy Macias MD   6,800 Units at 07/18/17 1219   • heparin 89045 units/250 mL (100 units/mL) in 0.45 % NaCl infusion  1,500 Units/hr Intravenous Titrated Chevy Macias MD 16.66 mL/hr at 07/18/17 1218 1,666 Units/hr at 07/18/17 1218   • sodium chloride 0.9 % flush 1-10 mL  1-10 mL Intravenous PRN Chevy Macias MD       • warfarin (COUMADIN) tablet 5 mg  5 mg Oral Daily Chevy Macias MD   5 mg at 07/17/17 2222       Assessment/Plan     ASSESSMENT:    Principal Problem:    Acute deep vein thrombosis (DVT) of femoral vein of right lower extremity  Active Problems:    C5 on 6 anterolisthesis    Cervical vertebral fusion    Patient is Jehovah's witness    Acute DVT (deep venous thrombosis)    Acute deep vein thrombosis (DVT) of right femoral vein      PLAN: She was directly admitted from our office yesterday evening with symptoms of right lower extremity DVT after being seen for routine post op visit.  Stat duplex venous Doppler confirmed the DVTs and she was started on heparin following admission.  From a neurosurgical standpoint, there are no contraindications to anticoagulation therapy.  We recommend something that is reversible, such as Coumadin, if possible.  She continues to do well from our standpoint.  She is wearing cervical hard collar that is fitting well.  Postop cervical and thoracic x-ray imaging reveals stable findings as well.    I discussed the patients findings and my recommendations with patient, family, nursing staff and Dr Huerta       LOS: 0 days       Lilian Guillermo, APRN  7/18/2017  4:56 PM            "Electronically signed by Gordon Huerta IV, MD at 2017  8:06 PM      Jarocho Bass MD at 2017  7:53 PM  Version 1 of 1         DAILY PROGRESS NOTE  [facility]    Patient Identification:  Name: Alannah Delgado  Age: 62 y.o.  Sex: female  :  1955  MRN: [id number]         Primary Care Physician: Jc Reardon MD      Subjective  No new c/o.   No CP.  No SOA.     Objective:  General Appearance:  Comfortable, well-appearing, in no acute distress and not in pain.    Vital signs: (most recent): Blood pressure 125/88, pulse 95, temperature 98.2 °F (36.8 °C), temperature source Oral, resp. rate 16, height 62\" (157.5 cm), weight 186 lb (84.4 kg), SpO2 98 %.    Lungs:  Normal respiratory rate and normal effort.  Breath sounds clear to auscultation.    Heart: Normal rate.  Regular rhythm.    Extremities: There is dependent edema (RLE).    Neurological: Patient is alert and oriented to person, place and time.    Skin:  Warm and dry.                Vital signs in last 24 hours:  Temp:  [97.8 °F (36.6 °C)-98.2 °F (36.8 °C)] 98.2 °F (36.8 °C)  Heart Rate:  [81-95] 95  Resp:  [16] 16  BP: (120-128)/(77-88) 125/88    Intake/Output:    Intake/Output Summary (Last 24 hours) at 17  Last data filed at 17 1733   Gross per 24 hour   Intake             1380 ml   Output               75 ml   Net             1305 ml           Results from last 7 days  Lab Units 17  0312 17  1938   WBC 10*3/mm3 7.84 8.84   HEMOGLOBIN g/dL 12.0 12.6   PLATELETS 10*3/mm3 240 260     Results from last 7 days  Lab Units 17  0313   SODIUM mmol/L 138   POTASSIUM mmol/L 3.5   CHLORIDE mmol/L 101   CO2 mmol/L 21.4*   BUN mg/dL 10   CREATININE mg/dL 0.59   GLUCOSE mg/dL 102*   Estimated Creatinine Clearance: 100.9 mL/min (by C-G formula based on Cr of 0.59).  Results from last 7 days  Lab Units 17  0313   CALCIUM mg/dL 9.2   ALBUMIN g/dL 3.70     Results from last 7 days  Lab Units " 07/18/17  0313   ALBUMIN g/dL 3.70   BILIRUBIN mg/dL 0.8   ALK PHOS U/L 75   AST (SGOT) U/L 22   ALT (SGPT) U/L 28       Assessment:  Principal Problem:    Acute deep vein thrombosis (DVT) of femoral vein of right lower extremity  Active Problems:    C5 on 6 anterolisthesis    Cervical vertebral fusion    Patient is Druze    Acute DVT (deep venous thrombosis)    Acute deep vein thrombosis (DVT) of right femoral vein      Plan:  Cont w Hep drip w transition to Coumadin.  OK to increase activity and trans to med/surg from my of view.  ECS.     Jarocho Bass MD  7/18/2017  7:53 PM       Electronically signed by Jarocho Bass MD at 7/18/2017  7:57 PM              Orders Placed This Encounter   Procedures   • CBC (No Diff)     Standing Status:   Standing     Number of Occurrences:   1   • Comprehensive Metabolic Panel     Standing Status:   Standing     Number of Occurrences:   1   • aPTT     Standing Status:   Standing     Number of Occurrences:   1   • Protime-INR     Standing Status:   Standing     Number of Occurrences:   1   • aPTT     Standing Status:   Standing     Number of Occurrences:   1   • Protime-INR     Prior to Initial Heparin Bolus     Standing Status:   Standing     Number of Occurrences:   1   • aPTT     Cancel If Patient Has Infusion Changes     Standing Status:   Standing     Number of Occurrences:   18   • CBC Auto Differential     Prior to Initial Heparin Bolus     Standing Status:   Standing     Number of Occurrences:   1   • aPTT     Standing Status:   Standing     Number of Occurrences:   1     Order Specific Question:   Patient Receiving Heparin Therapy?     Answer:   Yes   • Comprehensive Metabolic Panel     Standing Status:   Standing     Number of Occurrences:   1   • CBC Auto Differential     Standing Status:   Standing     Number of Occurrences:   1   • Protime-INR     Standing Status:   Standing     Number of Occurrences:   1   • CBC Auto Differential      Discontinue After Heparin Stopped     Standing Status:   Standing     Number of Occurrences:   1   • CBC Auto Differential     Discontinue After Heparin Stopped     Standing Status:   Standing     Number of Occurrences:   1   • aPTT     Standing Status:   Standing     Number of Occurrences:   1     Order Specific Question:   Patient Receiving Heparin Therapy?     Answer:   Yes   • aPTT     Standing Status:   Standing     Number of Occurrences:   1     Order Specific Question:   Patient Receiving Heparin Therapy?     Answer:   Yes   • Protime-INR     Standing Status:   Standing     Number of Occurrences:   17   • CBC (No Diff)     Standing Status:   Standing     Number of Occurrences:   1   • aPTT     Standing Status:   Standing     Number of Occurrences:   1   • aPTT     Standing Status:   Standing     Number of Occurrences:   1     Order Specific Question:   Patient Receiving Heparin Therapy?     Answer:   Yes   • CBC Auto Differential     Discontinue After Heparin Stopped     Standing Status:   Standing     Number of Occurrences:   1   • aPTT     Standing Status:   Standing     Number of Occurrences:   1     Order Specific Question:   Patient Receiving Heparin Therapy?     Answer:   Yes   • Diet Regular     Standing Status:   Standing     Number of Occurrences:   1     Order Specific Question:   Diet Texture / Consistency     Answer:   Regular   • Adjust Heparin Rate Based on aPTT Using Nomogram     Standing Status:   Standing     Number of Occurrences:   1   • Verify enoxaparin (Lovenox) or fondaparinux (Arixtra) Orders Are Discontinued Upon Initiation of Heparin Protocol     Standing Status:   Standing     Number of Occurrences:   1   • RN To Release aPTT Order 6 Hours After Heparin Bolus & 6 Hours After Any Heparin Rate Change     Standing Status:   Standing     Number of Occurrences:   1   • Vital Signs     Standing Status:   Standing     Number of Occurrences:   1   • Strict Intake and Output     Standing  Status:   Standing     Number of Occurrences:   1   • Weigh Patient     Standing Status:   Standing     Number of Occurrences:   1   • Saline Lock & Maintain IV Access     Standing Status:   Standing     Number of Occurrences:   1   • Compression     Standing Status:   Standing     Number of Occurrences:   61032   • Place AMMY Hose     Rt leg.     Standing Status:   Standing     Number of Occurrences:   1   • Activity As Tolerated     Standing Status:   Standing     Number of Occurrences:   1   • Restart Heparin, decrease by 2 units, repeat PTT in 6 hours, and continue protocol as ordered for PTT of 116.3     Restart Heparin, decrease by 2 units, repeat PTT in 6 hours, and continue protocol as ordered for PTT of 116.3     Standing Status:   Standing     Number of Occurrences:   1   • Full Code     Standing Status:   Standing     Number of Occurrences:   1   • Inpatient Consult to Neurosurgery     Standing Status:   Standing     Number of Occurrences:   1     Order Specific Question:   Reason for Consult?     Answer:   DVT s/p C spine surgery.  Input on anticoag preference appreciated.     Order Specific Question:   Type of Care Required:     Answer:   Request Advice or Opinion for Condition     Order Specific Question:   Consulting Provider Contacted     Answer:   No   • Oxygen Therapy-     Standing Status:   Standing     Number of Occurrences:   1     Order Specific Question:   Keep O2 Sat At or Above:     Answer:   90%     Order Specific Question:   Device     Answer:   Nasal Cannula   • Insert Peripheral IV     Standing Status:   Standing     Number of Occurrences:   1   • Initiate Observation Status     Standing Status:   Standing     Number of Occurrences:   1     Order Specific Question:   Level of Care     Answer:   Telemetry [5]     Order Specific Question:   Diagnosis     Answer:   Acute DVT (deep venous thrombosis) [980223]   • Inpatient Admission     Standing Status:   Standing     Number of Occurrences:    1     Order Specific Question:   Level of Care     Answer:   Med/Surg [1]     Order Specific Question:   Diagnosis     Answer:   Acute deep vein thrombosis (DVT) of right femoral vein [3808849]     Order Specific Question:   Admitting Physician     Answer:   ELLI MENDOZA [[id number]]     Order Specific Question:   Attending Physician     Answer:   CARROLL MONROY [[id number]]     Order Specific Question:   Certification     Answer:   I certify that inpatient hospital services are medically necessary for greater than 2 midnights.   • Transfer Patient     Standing Status:   Standing     Number of Occurrences:   1     Order Specific Question:   Level of Care     Answer:   Med/Surg [1]   • CBC & Differential     Prior to Initial Heparin Bolus     Standing Status:   Standing     Number of Occurrences:   1     Order Specific Question:   Manual Differential     Answer:   No   • CBC & Differential     Discontinue After Heparin Stopped     Standing Status:   Standing     Number of Occurrences:   18     Order Specific Question:   Manual Differential     Answer:   No

## 2017-07-19 NOTE — PROGRESS NOTES
Discharge Planning Assessment  [facility]     Patient Name: Alannah Delgado  MRN: [id number]  Today's Date: 7/19/2017    Admit Date: 7/17/2017          Discharge Needs Assessment       07/19/17 1347    Living Environment    Lives With parent(s)   FATHER    Living Arrangements mobile home    Home Accessibility bed and bath on same level;no concerns    Stair Railings at Home none    Type of Financial/Environmental Concern none    Transportation Available family or friend will provide;car    Living Environment    Provides Primary Care For parent(s)    Quality Of Family Relationships supportive;helpful;involved    Able to Return to Prior Living Arrangements yes    Discharge Needs Assessment    Concerns To Be Addressed denies needs/concerns at this time    Community Agency Name(S) [facility] [phone]    Anticipated Changes Related to Illness none    Equipment Currently Used at Home other (see comments);walker, rolling   Pt uses a bone stimulator for her neck and wears a neck collar.    Equipment Needed After Discharge none    Discharge Facility/Level Of Care Needs home with home health    Discharge Contact Information if Applicable Marie Warren [phone]            Discharge Plan       07/19/17 1359    Case Management/Social Work Plan    Plan Home to [place], [place] and Pt current with [facility].    Patient/Family In Agreement With Plan unable to assess    Additional Comments Spoke with Pt at bedside.  CCP Introduced self and role of CCP.  Pt confirmed information on face sheet.  Pt states [employer], worked full time prior to her neck surgery in May 2017.  Pt lives with her  86 y/o father.  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  Pt states she has arranged home health and additional caregivers to assist father while she is hospitalized.  Pt states she is current with [facility].  CCP called HH and s/w Carloz [phone] and he confirms Pt is current with  physical therapy services but nursing signed off on Pt 7/3/17.  Pt was in [facility] in May.  Pt confirms pharmacy as [facility] [address] in [place], [place].  Pt plans to return home at d/c with assist of [employer] family and son lives nearby.    Pt d/c summary will need to be faxed to [facility] once discharged.  Fx: [phone].  CCP following.         Discharge Placement     Facility/Agency Request Status Selected? Address Phone Number Fax Number    [facility] Accepted    Yes [address] [facility] [address] [phone]                Demographic Summary       07/19/17 1346    Referral Information    Admission Type inpatient    Arrived From admitted as an inpatient    Referral Source physician    Reason For Consult discharge planning    Contact Information    Permission Granted to Share Information With family/designee    Primary Care Physician Information    Name Jc Reardon MD            Functional Status       07/19/17 1347    Functional Status Current    Ambulation 2-->assistive person    Transferring 0-->independent    Toileting 2-->assistive person    Bathing 0-->independent    Dressing 0-->independent    Eating 0-->independent    Communication 0-->understands/communicates without difficulty    Swallowing (if score 2 or more for any item, consult Rehab Services) 0-->swallows foods/liquids without difficulty    Change in Functional Status Since Onset of Current Illness/Injury no    Functional Status Prior    Ambulation 0-->independent    Transferring 0-->independent    Toileting 0-->independent    Bathing 0-->independent    Dressing 0-->independent    Eating 0-->independent    Communication 0-->understands/communicates without difficulty    Swallowing 0-->swallows foods/liquids without difficulty    Cognitive/Perceptual/Developmental    Current Mental Status/Cognitive Functioning no deficits noted            Psychosocial      None            Abuse/Neglect     None            Legal     None            Substance Abuse     None            Patient Forms     None          Madeleine Mendez RN

## 2017-07-19 NOTE — PROGRESS NOTES
"DAILY PROGRESS NOTE  [facility]    Patient Identification:  Name: Alannah Delgado  Age: 62 y.o.  Sex: female  :  1955  MRN: [id number]         Primary Care Physician: Jc Reardon MD      Subjective  No new c/o.  Still rt calf pain on walking.     Objective:  General Appearance:  Comfortable, well-appearing, in no acute distress and not in pain.    Vital signs: (most recent): Blood pressure 126/80, pulse 89, temperature 98.7 °F (37.1 °C), temperature source Oral, resp. rate 16, height 62\" (157.5 cm), weight 186 lb (84.4 kg), SpO2 96 %.    Lungs:  Normal respiratory rate and normal effort.  Breath sounds clear to auscultation.    Heart: Normal rate.  Regular rhythm.    Extremities: There is dependent edema (RLE.  Improved. ).    Neurological: Patient is alert and oriented to person, place and time.    Skin:  Warm and dry.                Vital signs in last 24 hours:  Temp:  [97.6 °F (36.4 °C)-98.7 °F (37.1 °C)] 98.7 °F (37.1 °C)  Heart Rate:  [] 89  Resp:  [16] 16  BP: (123-133)/(68-91) 126/80    Intake/Output:    Intake/Output Summary (Last 24 hours) at 17 1643  Last data filed at 17 1350   Gross per 24 hour   Intake              600 ml   Output                0 ml   Net              600 ml           Results from last 7 days  Lab Units 17  0403 17  0312 17  1938   WBC 10*3/mm3 6.22 7.84 8.84   HEMOGLOBIN g/dL 12.0 12.0 12.6   PLATELETS 10*3/mm3 238 240 260     Results from last 7 days  Lab Units 17  0313   SODIUM mmol/L 138   POTASSIUM mmol/L 3.5   CHLORIDE mmol/L 101   CO2 mmol/L 21.4*   BUN mg/dL 10   CREATININE mg/dL 0.59   GLUCOSE mg/dL 102*   Estimated Creatinine Clearance: 100.9 mL/min (by C-G formula based on Cr of 0.59).  Results from last 7 days  Lab Units 17  0313   CALCIUM mg/dL 9.2   ALBUMIN g/dL 3.70     Results from last 7 days  Lab Units 17  0313   ALBUMIN g/dL 3.70   BILIRUBIN mg/dL 0.8   ALK PHOS U/L 75   AST (SGOT) " U/L 22   ALT (SGPT) U/L 28       Assessment:  Principal Problem:    Acute deep vein thrombosis (DVT) of femoral vein of right lower extremity  Active Problems:    C5 on 6 anterolisthesis    Cervical vertebral fusion    Patient is Taoist        Plan:  Cont heparin w coumadin transition.  Increase activity.     Jarocho Bass MD  7/19/2017  4:43 PM

## 2017-07-20 LAB
APTT PPP: 127.1 SECONDS (ref 22.7–35.4)
APTT PPP: 156.4 SECONDS (ref 22.7–35.4)
APTT PPP: 48.2 SECONDS (ref 22.7–35.4)
BASOPHILS # BLD AUTO: 0.02 10*3/MM3 (ref 0–0.2)
BASOPHILS NFR BLD AUTO: 0.3 % (ref 0–1.5)
DEPRECATED RDW RBC AUTO: 49.5 FL (ref 37–54)
EOSINOPHIL # BLD AUTO: 0.21 10*3/MM3 (ref 0–0.7)
EOSINOPHIL NFR BLD AUTO: 3.6 % (ref 0.3–6.2)
ERYTHROCYTE [DISTWIDTH] IN BLOOD BY AUTOMATED COUNT: 14.3 % (ref 11.7–13)
HCT VFR BLD AUTO: 37.4 % (ref 35.6–45.5)
HGB BLD-MCNC: 11.6 G/DL (ref 11.9–15.5)
IMM GRANULOCYTES # BLD: 0 10*3/MM3 (ref 0–0.03)
IMM GRANULOCYTES NFR BLD: 0 % (ref 0–0.5)
INR PPP: 1.71 (ref 0.9–1.1)
LYMPHOCYTES # BLD AUTO: 1.53 10*3/MM3 (ref 0.9–4.8)
LYMPHOCYTES NFR BLD AUTO: 26.6 % (ref 19.6–45.3)
MCH RBC QN AUTO: 29.7 PG (ref 26.9–32)
MCHC RBC AUTO-ENTMCNC: 31 G/DL (ref 32.4–36.3)
MCV RBC AUTO: 95.7 FL (ref 80.5–98.2)
MONOCYTES # BLD AUTO: 0.81 10*3/MM3 (ref 0.2–1.2)
MONOCYTES NFR BLD AUTO: 14.1 % (ref 5–12)
NEUTROPHILS # BLD AUTO: 3.19 10*3/MM3 (ref 1.9–8.1)
NEUTROPHILS NFR BLD AUTO: 55.4 % (ref 42.7–76)
PLATELET # BLD AUTO: 267 10*3/MM3 (ref 140–500)
PMV BLD AUTO: 10.5 FL (ref 6–12)
PROTHROMBIN TIME: 19.5 SECONDS (ref 11.7–14.2)
RBC # BLD AUTO: 3.91 10*6/MM3 (ref 3.9–5.2)
WBC NRBC COR # BLD: 5.76 10*3/MM3 (ref 4.5–10.7)

## 2017-07-20 PROCEDURE — 85730 THROMBOPLASTIN TIME PARTIAL: CPT | Performed by: HOSPITALIST

## 2017-07-20 PROCEDURE — 25010000002 HEPARIN (PORCINE) PER 1000 UNITS: Performed by: INTERNAL MEDICINE

## 2017-07-20 PROCEDURE — 85610 PROTHROMBIN TIME: CPT | Performed by: HOSPITALIST

## 2017-07-20 PROCEDURE — 85025 COMPLETE CBC W/AUTO DIFF WBC: CPT | Performed by: INTERNAL MEDICINE

## 2017-07-20 PROCEDURE — 97161 PT EVAL LOW COMPLEX 20 MIN: CPT

## 2017-07-20 RX ORDER — WARFARIN SODIUM 6 MG/1
6 TABLET ORAL
Status: DISCONTINUED | OUTPATIENT
Start: 2017-07-20 | End: 2017-07-21

## 2017-07-20 RX ADMIN — WARFARIN SODIUM 6 MG: 6 TABLET ORAL at 17:02

## 2017-07-20 RX ADMIN — ACETAMINOPHEN 650 MG: 325 TABLET ORAL at 14:02

## 2017-07-20 RX ADMIN — HEPARIN SODIUM 6800 UNITS: 5000 INJECTION, SOLUTION INTRAVENOUS; SUBCUTANEOUS at 12:12

## 2017-07-20 NOTE — PLAN OF CARE
Problem: Patient Care Overview (Adult)  Goal: Plan of Care Review  Outcome: Ongoing (interventions implemented as appropriate)    07/20/17 1445   Coping/Psychosocial Response Interventions   Plan Of Care Reviewed With patient   Patient Care Overview   Progress no change   Outcome Evaluation   Outcome Summary/Follow up Plan patient still on heparin gtt to bridge til coumadin INR is therapeutic, then plan is for discharge hopefully tomorrow        Goal: Adult Individualization and Mutuality  Outcome: Ongoing (interventions implemented as appropriate)  Goal: Discharge Needs Assessment  Outcome: Ongoing (interventions implemented as appropriate)    Problem: VTE, DVT and PE (Adult)  Goal: Signs and Symptoms of Listed Potential Problems Will be Absent or Manageable (VTE, DVT and PE)  Outcome: Ongoing (interventions implemented as appropriate)

## 2017-07-20 NOTE — PLAN OF CARE
Problem: Patient Care Overview (Adult)  Goal: Plan of Care Review    07/20/17 1359   Coping/Psychosocial Response Interventions   Plan Of Care Reviewed With patient   Outcome Evaluation   Outcome Summary/Follow up Plan pt presents w/ generalized weakness post RLE DVT. Pt wearing cervical hard collar, julian indep bed mobility, able to amb short distance w/ CGA and FWW, c/o increased pain w/ amb but denies any other s/s, does not show any significant ROM or strength deficits. May benefit from skilled PT to address gait and stair mobility. Will cont to tx until DC.          Problem: Inpatient Physical Therapy  Goal: Transfer Training Goal 1 LTG- PT    07/20/17 1359   Transfer Training PT LTG   Transfer Training PT LTG, Date Established 07/20/17   Transfer Training PT LTG, Time to Achieve 3 days   Transfer Training PT LTG, Activity Type all transfers   Transfer Training PT LTG, Clarke Level independent   Transfer Training PT LTG, Assist Device walker, rolling       Goal: Gait Training Goal LTG- PT    07/20/17 1359   Gait Training PT LTG   Gait Training Goal PT LTG, Date Established 07/20/17   Gait Training Goal PT LTG, Time to Achieve 3 days   Gait Training Goal PT LTG, Clarke Level independent   Gait Training Goal PT LTG, Assist Device walker, rolling   Gait Training Goal PT LTG, Distance to Achieve 200       Goal: Stair Training Goal LTG- PT    07/20/17 1359   Stair Training PT LTG   Stair Training Goal PT LTG, Date Established 07/20/17   Stair Training Goal PT LTG, Number of Steps 3   Stair Training Goal PT LTG, Clarke Level independent   Stair Training Goal PT LTG, Assist Device walker, rolling

## 2017-07-20 NOTE — THERAPY EVALUATION
Acute Care - Physical Therapy Initial Evaluation  [facility]     Patient Name: Alannah Delgado  : 1955  MRN: [id number]  Today's Date: 2017   Onset of Illness/Injury or Date of Surgery Date: (P) 17     Referring Physician: Bass (P)      Admit Date: 2017     Visit Dx:    ICD-10-CM ICD-9-CM   1. Generalized weakness R53.1 780.79     Patient Active Problem List   Diagnosis   • Scoliosis (and kyphoscoliosis), idiopathic   • Neck pain of over 3 months duration   • Cervical swan-neck deformity, acquired   • C5 on 6 anterolisthesis   • Cervical vertebral fusion   • Acute deep vein thrombosis (DVT) of femoral vein of right lower extremity   • Patient is Zoroastrianism     Past Medical History:   Diagnosis Date   • History of radiation therapy    • Limited joint range of motion     NECK   • Migraine    • Neck mass     benign   history of   • Neck pain    • Patient is Zoroastrianism      Past Surgical History:   Procedure Laterality Date   • ANTERIOR CERVICAL DISCECTOMY W/ FUSION Right 5/3/2017    Procedure: C6 corpectomy and anterior instrumentation.  ;  Surgeon: Ha Guidry MD;  Location: [facility];  Service:    • CERVICAL DISCECTOMY POSTERIOR FUSION WITH BRAIN LAB N/A 5/3/2017    Procedure: Posterior cervical decompression and removal of cervical posterior hardware.  Posterior cervical fusion C2 through T3-instrumented with mastergraft.  Halo placement.;  Surgeon: Ha Guidry MD;  Location: 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    mavis and screw placement   • WOUND CLOSURE N/A 5/3/2017    Procedure: ADVANCEMENT FLAP CLOSURE CERVICAL SPINE WOUND ;  Surgeon: Gordon Wilson Jr., MD;  Location: [facility] OR;  Service:           PT ASSESSMENT (last 72 hours)      PT Evaluation       17 1300 17 1347    Rehab Evaluation    Document Type (P)  evaluation  -KS     Subjective  Information (P)  agree to therapy;complains of;pain  -KS     Patient Effort, Rehab Treatment (P)  good  -KS     Symptoms Noted During/After Treatment (P)  increased pain  -KS     General Information    Patient Profile Review (P)  yes  -KS     Onset of Illness/Injury or Date of Surgery Date (P)  07/17/17  -KS     Referring Physician (P)  Vincent  -KS     General Observations (P)  pt sitting up in bed   -KS     Pertinent History Of Current Problem (P)  RLE DVT  -KS     Precautions/Limitations (P)  fall precautions  -KS     Prior Level of Function (P)  independent:  -KS     Equipment Currently Used at Home (P)  walker, rolling   cervical collar  -KS other (see comments);walker, rolling   Pt uses a bone stimulator for her neck and wears a neck collar.  -SS    Plans/Goals Discussed With (P)  patient  -KS     Risks Reviewed (P)  patient:  -KS     Benefits Reviewed (P)  patient:  -KS     Living Environment    Lives With  parent(s)   FATHER  -SS    Living Arrangements  mobile home  -SS    Home Accessibility (P)  stairs to enter home  -KS bed and bath on same level;no concerns  -    Stair Railings at Home  none  -SS    Type of Financial/Environmental Concern  none  -SS    Transportation Available  family or friend will provide;car  -SS    Clinical Impression    Criteria for Skilled Therapeutic Interventions Met (P)  yes  -KS     Pathology/Pathophysiology Noted (Describe Specifically for Each System) (P)  musculoskeletal  -KS     Impairments Found (describe specific impairments) (P)  gait, locomotion, and balance;circulation  -KS     Rehab Potential (P)  good, to achieve stated therapy goals  -KS     Pain Assessment    Pain Assessment (P)  0-10  -KS     Pain Score (P)  2  -KS     Post Pain Score (P)  8  -KS     Pain Type (P)  Acute pain  -KS     Pain Location (P)  Knee  -KS     Pain Orientation (P)  Posterior;Lower  -KS     Pain Intervention(s) (P)  Repositioned;Ambulation/increased activity  -KS     Vision  Assessment/Intervention    Visual Impairment (P)  WFL  -KS     Cognitive Assessment/Intervention    Current Cognitive/Communication Assessment (P)  functional  -KS     Orientation Status (P)  oriented x 4  -KS     Follows Commands/Answers Questions (P)  100% of the time;able to follow multi-step instructions  -KS     Personal Safety (P)  WNL/WFL  -KS     ROM (Range of Motion)    General ROM (P)  no range of motion deficits identified  -KS     MMT (Manual Muscle Testing)    General MMT Assessment (P)  no strength deficits identified  -KS     Bed Mobility, Assessment/Treatment    Bed Mobility, Scoot/Bridge, [place] (P)  independent  -KS     Bed Mob, Supine to Sit, [place] (P)  independent  -KS     Transfer Assessment/Treatment    Transfers, Sit-Stand [place] (P)  conditional independence;verbal cues required  -KS     Transfers, Stand-Sit [place] (P)  conditional independence  -KS     Transfers, Sit-Stand-Sit, Assist Device (P)  rolling walker  -KS     Transfer, Impairments (P)  pain  -KS     Gait Assessment/Treatment    Gait, [place] Level (P)  contact guard assist  -KS     Gait, Assistive Device (P)  rolling walker  -KS     Gait, Distance (Feet) (P)  70  -KS     Gait, Gait Pattern Analysis (P)  swing-through gait  -KS     Gait, Gait Deviations (P)  step length decreased  -KS     Gait, Impairments (P)  pain  -KS     Stairs Assessment/Treatment    Stairs, Comment (P)  deferred 2' pain  -KS     Positioning and Restraints    Pre-Treatment Position (P)  in bed  -KS     Post Treatment Position (P)  chair  -KS     In Chair (P)  reclined;call light within reach;encouraged to call for assist  -KS       07/17/17 1907 07/17/17 1902    General Information    Equipment Currently Used at Home  --   ; bone stimulator, currently wearing  -CW    Living Environment    Lives With parent(s)  -CW     Living Arrangements house  -CW     Home Accessibility no concerns  -CW     Stair Railings at Home none  -CW      Type of Financial/Environmental Concern none  -CW     Transportation Available car;family or friend will provide  -CW       07/17/17 1894       General Information    Equipment Currently Used at Home other (see comments)  -CW       User Key  (r) = Recorded By, (t) = Taken By, (c) = Cosigned By    Initials Name Provider Type    CW Rosemarie URSULA Bella, RN Registered Nurse    SUSAN Mendez, RN Case Manager    KS Lotus Reynolds, PT Student PT Student          Physical Therapy Education     Title: PT OT SLP Therapies (Done)     Topic: Physical Therapy (Done)     Point: Mobility training (Done)    Learning Progress Summary    Learner Readiness Method Response Comment Documented by Status   Patient Acceptance [facility] 07/20/17 1358 Done               Point: Home exercise program (Done)    Learning Progress Summary    Learner Readiness Method Response Comment Documented by Status   Patient Acceptance E [facility] 07/20/17 1358 Done               Point: Body mechanics (Done)    Learning Progress Summary    Learner Readiness Method Response Comment Documented by Status   Patient Acceptance E [facility] 07/20/17 1358 Done               Point: Precautions (Done)    Learning Progress Summary    Learner Readiness Method Response Comment Documented by Status   Patient Acceptance E [facility] 07/20/17 1358 Done                      User Key     Initials Effective Dates Name Provider Type Discipline    KS 05/08/17 -  Lotus Reynolds, PT Student PT Student PT                PT Recommendation and Plan  Anticipated Discharge Disposition: (P) home with home health  Planned Therapy Interventions: (P) gait training, patient/family education, transfer training, stair training  PT Frequency: (P) daily  Plan of Care Review  Plan Of Care Reviewed With: (P) patient  Outcome Summary/Follow up Plan: (P) pt presents w/ generalized weakness post RLE DVT. Pt wearing cervical hard collar, julian indep bed mobility, able to amb short distance w/  CGA and  FWW, c/o increased pain w/ amb  but denies any other s/s, does not show any significant ROM or strength deficits. May benefit from skilled PT to address gait and stair mobility. Will cont to tx until DC.           IP PT Goals       07/20/17 1359          Transfer Training PT LTG    Transfer Training PT LTG, Date Established (P)  07/20/17  -KS      Transfer Training PT LTG, Time to Achieve (P)  3 days  -KS      Transfer Training PT LTG, Activity Type (P)  all transfers  -KS      Transfer Training PT LTG, [place] Level (P)  independent  -KS      Transfer Training PT LTG, Assist Device (P)  walker, rolling  -KS      Gait Training PT LTG    Gait Training Goal PT LTG, Date Established (P)  07/20/17  -KS      Gait Training Goal PT LTG, Time to Achieve (P)  3 days  -KS      Gait Training Goal PT LTG, [place] Level (P)  independent  -KS      Gait Training Goal PT LTG, Assist Device (P)  walker, rolling  -KS      Gait Training Goal PT LTG, Distance to Achieve (P)  200  -KS      Stair Training PT LTG    Stair Training Goal PT LTG, Date Established (P)  07/20/17  -KS      Stair Training Goal PT LTG, Number of Steps (P)  3  -KS      Stair Training Goal PT LTG, [place] Level (P)  independent  -KS      Stair Training Goal PT LTG, Assist Device (P)  walker, rolling  -KS        User Key  (r) = Recorded By, (t) = Taken By, (c) = Cosigned By    Initials Name Provider Type    DICK Reynolds, PT Student PT Student                Outcome Measures       07/20/17 1400          How much help from another person do you currently need...    Turning from your back to your side while in flat bed without using bedrails? (P)  4  -KS      Moving from lying on back to sitting on the side of a flat bed without bedrails? (P)  4  -KS      Moving to and from a bed to a chair (including a wheelchair)? (P)  3  -KS      Standing up from a chair using your arms (e.g., wheelchair, bedside chair)? (P)  3  -KS      Climbing 3-5 steps  with a railing? (P)  3  -KS      To walk in hospital room? (P)  3  -KS      AM-PAC 6 Clicks Score (P)  20  -KS      Functional Assessment    Outcome Measure Options (P)  AM-PAC 6 Clicks Basic Mobility (PT)  -KS        User Key  (r) = Recorded By, (t) = Taken By, (c) = Cosigned By    Initials Name Provider Type    DICK Reynolds PT Student PT Student           Time Calculation:         PT Charges       07/20/17 1404          Time Calculation    Start Time (P)  1335  -KS      Stop Time (P)  1354  -KS      Time Calculation (min) (P)  19 min  -KS      PT Received On (P)  07/20/17  -KS      PT - Next Appointment (P)  07/21/17  -KS      PT Goal Re-Cert Due Date (P)  07/23/17  -KS        User Key  (r) = Recorded By, (t) = Taken By, (c) = Cosigned By    Initials Name Provider Type    DICK Reynolds PT Student PT Student          Therapy Charges for Today     Code Description Service Date Service Provider Modifiers Qty    [id number] HC PT EVAL LOW COMPLEXITY 2 7/20/2017 Lotus Reynolds PT Student GP 1          PT G-Codes  Outcome Measure Options: (P) AM-PAC 6 Clicks Basic Mobility (PT)      Lotus Reynolds PT Student  7/20/2017

## 2017-07-21 VITALS
SYSTOLIC BLOOD PRESSURE: 122 MMHG | BODY MASS INDEX: 34.23 KG/M2 | TEMPERATURE: 96.5 F | DIASTOLIC BLOOD PRESSURE: 80 MMHG | HEIGHT: 62 IN | OXYGEN SATURATION: 98 % | RESPIRATION RATE: 18 BRPM | WEIGHT: 186 LBS | HEART RATE: 75 BPM

## 2017-07-21 LAB
APTT PPP: 63.5 SECONDS (ref 22.7–35.4)
INR PPP: 2.13 (ref 0.9–1.1)
PROTHROMBIN TIME: 23.1 SECONDS (ref 11.7–14.2)

## 2017-07-21 PROCEDURE — 85610 PROTHROMBIN TIME: CPT | Performed by: HOSPITALIST

## 2017-07-21 PROCEDURE — 85730 THROMBOPLASTIN TIME PARTIAL: CPT | Performed by: HOSPITALIST

## 2017-07-21 PROCEDURE — 25010000002 HEPARIN (PORCINE) PER 1000 UNITS: Performed by: INTERNAL MEDICINE

## 2017-07-21 RX ORDER — WARFARIN SODIUM 5 MG/1
5 TABLET ORAL
Qty: 30 TABLET | Refills: 0 | Status: SHIPPED | OUTPATIENT
Start: 2017-07-21

## 2017-07-21 RX ORDER — WARFARIN SODIUM 5 MG/1
5 TABLET ORAL
Status: DISCONTINUED | OUTPATIENT
Start: 2017-07-21 | End: 2017-07-21 | Stop reason: HOSPADM

## 2017-07-21 RX ORDER — WARFARIN SODIUM 5 MG/1
5 TABLET ORAL
Qty: 30 TABLET | Refills: 0 | Status: SHIPPED | OUTPATIENT
Start: 2017-07-21 | End: 2017-07-21

## 2017-07-21 RX ADMIN — ACETAMINOPHEN 650 MG: 325 TABLET ORAL at 02:16

## 2017-07-21 RX ADMIN — HEPARIN SODIUM 14.74 UNITS/KG/HR: 10000 INJECTION, SOLUTION INTRAVENOUS at 07:28

## 2017-07-21 NOTE — SIGNIFICANT NOTE
07/21/17 1116   PT Discharge Summary   Anticipated Discharge Disposition home with home health   Reason for Discharge Discharge from facility   Discharge Destination Home with home health

## 2017-07-21 NOTE — PROGRESS NOTES
Continued Stay Note  [facility]     Patient Name: Alannah Delgado  MRN: [id number]  Today's Date: 7/21/2017    Admit Date: 7/17/2017          Discharge Plan       07/21/17 1109    Case Management/Social Work Plan    Plan Home with [facility]     Patient/Family In Agreement With Plan yes    Additional Comments Discussed DC needs with MD. Called to Sarah[facility] and they can draw patient's INR on Monday (Pt is current). Called to Steffi/Dr. Reardon's office [phone] and they can follow her INR's as her PCP. Informed that she will be seen by DANNY SEAMAN and is discharging today.     7/21/17 1145 - Faxed DC Summary and HH order to Caty SEAMAN.               Discharge Codes     None        Expected Discharge Date and Time     Expected Discharge Date Expected Discharge Time    Jul 21, 2017             Leonard Delgado RN

## 2017-07-21 NOTE — DISCHARGE SUMMARY
PHYSICIAN DISCHARGE SUMMARY                                                                        [facility]    Patient Identification:  Name: Alannah Delgado  Age: 62 y.o.  Sex: female  :  1955  MRN: [id number]  Primary Care Physician: Jc Reardon MD    Admit date: 2017  Discharge date and time: 2017     Discharged Condition: good    Discharge Diagnoses:Principal Problem:    Acute deep vein thrombosis (DVT) of femoral vein of right lower extremity  Active Problems:    C5 on 6 anterolisthesis    Cervical vertebral fusion    Patient is Yazidi         Hospital Course:  Pleasant 62-year-old female who and undergone cervical spine surgery in late March of this year.  She presented to her neurosurgeon for follow-up visit and had noted right leg pain and swelling.   Venous Dopplers are unremarkable for acute DVT involving the proximal femoral, mid femoral and distal femoral and popliteal as well as gastrocnemius veins.  She was then admitted to this facility for treatment thereof.  Neurosurgery has recommended and requested the use of reversible agents.  She was started on heparin drip as a bridge and started on oral Coumadin at 5 mg daily with exception of receiving 6 mg yesterday.  Support stockings were applied and the patient elevated her leg the first 24 hours in sense has been ambulating.  She has responded very nicely with resolution of the edema and marked improvement of the discomfort.  Today her INR is 2.13 and she'll be discharged on 5 mg Coumadin daily and INR rechecked on Monday.  [facility] is making arrangements to be sure this rechecked and followed up by her primary care physician.      Consults:     Consults     Date and Time Order Name Status Description    2017 1529 Inpatient Consult to Neurosurgery              Discharge Exam:  Physical Exam  Afebrile vital signs stable.  Well-developed  well-nourished female in no apparent distress.  Lungs clear to auscultation with good air movement.  Heart regular rate and rhythm.  Extremities with no clubbing cyanosis or edema.  Alert oriented conversant cooperative and pleasant.     Disposition:  Home    Patient Instructions:    Sandy Alannah   Home Medication Instructions CHRISTI:[id number]    Printed on:07/21/17 114   Medication Information                      acetaminophen (TYLENOL) 325 MG tablet  Take 2 tablets by mouth Every 4 (Four) Hours As Needed for Mild Pain (1-3).             cyclobenzaprine (FLEXERIL) 10 MG tablet  Take 1 tablet by mouth 3 (Three) Times a Day As Needed for Muscle Spasms.             warfarin (COUMADIN) 5 MG tablet  Take 1 tablet by mouth Daily. Or as directed.               Future Appointments  Date Time Provider Department Center   8/21/2017 2:30 PM MAICO Olivera [facility] None     Additional Instructions for the Follow-ups that You Need to Schedule     Ambulatory Referral to Home Health    As directed    Face to Face Visit Date:  7/21/2017   Follow-up Provider for Plan of Care?:  I treated the patient in an acute care facility and will not continue treatment after discharge.   Follow-up Provider:  MELI ALSTON   Reason/Clinical Findings:  s/p C spine surgery.   Describe mobility limitations that make leaving home difficult:  See above.   Nursing/Therapeutic Services Requested:  Physical Therapy   PT orders:   Comment - As prior to admision.    Frequency:  1 Week 1       Discharge Follow-up with PCP    As directed    Follow Up Details:  1 week       Discharge Follow-up with Specialty    As directed    Specialty:  Neurosurgery   Follow Up Details:  As directed.       Protime-INR    Jul 24, 2017 (Approximate)              Follow-up Information     Follow up with ANAI PLASENCIA [facility] .    Specialty:  Home Health Services    Contact information:    [address] [facility]  [address]  [phone]        Follow up with Jc Reardon MD .    Specialty:  Internal Medicine    Why:  1 week    Contact information:    [address] [place] [address] [phone]          Discharge Order     Start     Ordered    07/21/17 1024  Discharge patient  Once     Expected Discharge Date:  07/21/17    Discharge Disposition:  Home or Self Care        07/21/17 1024            Total time spent discharging patient including evaluation,post hospitalization follow up,  medication and post hospitalization instructions and education total time exceeds 30 minutes.    Signed:  Jarocho Bass MD  7/21/2017  11:49 AM    EMR Dragon/Transcription disclaimer:   Much of this encounter note is an electronic transcription/translation of spoken language to printed text. The electronic translation of spoken language may permit erroneous, or at times, nonsensical words or phrases to be inadvertently transcribed; Although I have reviewed the note for such errors, some may still exist.

## 2017-07-24 NOTE — PAYOR COMM NOTE
"Alannah Cuadra (62 y.o. Female)     Date of Birth Social Security Number Address Home Phone MRN    1955  [address]   [place] [address] [phone] [id number]    [employer] Marital Status          Unknown        Admission Date Admission Type Admitting Provider Attending Provider Department, Room/Bed    17 Urgent Chevy Macias MD  [address], [address]    Discharge Date Discharge Disposition Discharge Destination        2017 Home or Self Care             Attending Provider: (none)    Allergies:  Penicillins, Morphine And Related    Isolation:  None   Infection:  None   Code Status:  Prior    Ht:  62\" (157.5 cm)   Wt:  186 lb (84.4 kg)    Admission Cmt:  None   Principal Problem:  Acute deep vein thrombosis (DVT) of femoral vein of right lower extremity [I82.411]                 Active Insurance as of 2017     Primary Coverage     Payor Plan Insurance Group Employer/Plan Group    ANTHEM MEDICAID ANTHEM MEDICAID [id number]     Payor Plan Address Payor Plan Phone Number Effective From Effective To    [address] [address] [phone] 2014     [place] [address]       Subscriber Name Subscriber Birth Date Member ID       ALANNAH CUADRA 1955 [id number]                 Emergency Contacts      (Rel.) Home Phone Work Phone Mobile Phone    Marie Warren (Other) [phone] -- --        ATTN: VANESA REF#[id number]  D/C SUMMARY   THANKS!  ALE [phone]  OR  [phone]         Discharge Summary      Jarocho Bass MD at 2017 10:25 AM                                                                             PHYSICIAN DISCHARGE SUMMARY                                                                        [facility]    Patient Identification:  Name: Alannah Cuadra  Age: 62 y.o.  Sex: female  :  1955  MRN: [id number]  Primary Care Physician: Jc Reardon MD    Admit date: " 7/17/2017  Discharge date and time: 7/21/2017     Discharged Condition: good    Discharge Diagnoses:Principal Problem:    Acute deep vein thrombosis (DVT) of femoral vein of right lower extremity  Active Problems:    C5 on 6 anterolisthesis    Cervical vertebral fusion    Patient is Orthodoxy         [place] [facility] Course:  Pleasant 62-year-old female who and undergone cervical spine surgery in late March of this year.  She presented to her neurosurgeon for follow-up visit and had noted right leg pain and swelling.   Venous Dopplers are unremarkable for acute DVT involving the proximal femoral, mid femoral and distal femoral and popliteal as well as gastrocnemius veins.  She was then admitted to this facility for treatment thereof.  Neurosurgery has recommended and requested the use of reversible agents.  She was started on heparin drip as a bridge and started on oral Coumadin at 5 mg daily with exception of receiving 6 mg yesterday.  Support stockings were applied and the patient elevated her leg the first 24 hours in sense has been ambulating.  She has responded very nicely with resolution of the edema and marked improvement of the discomfort.  Today her INR is 2.13 and she'll be discharged on 5 mg Coumadin daily and INR rechecked on Monday.  [facility] is making arrangements to be sure this rechecked and followed up by her primary care physician.      Consults:     Consults     Date and Time Order Name Status Description    7/18/2017 1529 Inpatient Consult to Neurosurgery              Discharge Exam:  Physical Exam  Afebrile vital signs stable.  Well-developed well-nourished female in no apparent distress.  Lungs clear to auscultation with good air movement.  Heart regular rate and rhythm.  Extremities with no clubbing cyanosis or edema.  Alert oriented conversant cooperative and pleasant.     Disposition:  Home    Patient Instructions:    Alannah Delgado   Home Medication Instructions CHRISTI:[id number]    Printed  on:07/21/17 1149   Medication Information                      acetaminophen (TYLENOL) 325 MG tablet  Take 2 tablets by mouth Every 4 (Four) Hours As Needed for Mild Pain (1-3).             cyclobenzaprine (FLEXERIL) 10 MG tablet  Take 1 tablet by mouth 3 (Three) Times a Day As Needed for Muscle Spasms.             warfarin (COUMADIN) 5 MG tablet  Take 1 tablet by mouth Daily. Or as directed.               Future Appointments  Date Time Provider Department Center   8/21/2017 2:30 PM MAICO Olivera [facility] None     Additional Instructions for the Follow-ups that You Need to Schedule     Ambulatory Referral to Home Health    As directed    Face to Face Visit Date:  7/21/2017   Follow-up Provider for Plan of Care?:  I treated the patient in an acute care facility and will not continue treatment after discharge.   Follow-up Provider:  JC ALSTON   Reason/Clinical Findings:  s/p C spine surgery.   Describe mobility limitations that make leaving home difficult:  See above.   Nursing/Therapeutic Services Requested:  Physical Therapy   PT orders:   Comment - As prior to admision.    Frequency:  1 Week 1       Discharge Follow-up with PCP    As directed    Follow Up Details:  1 week       Discharge Follow-up with Specialty    As directed    Specialty:  Neurosurgery   Follow Up Details:  As directed.       Protime-INR    Jul 24, 2017 (Approximate)              Follow-up Information     Follow up with ANAI PLASENCAI [facility] .    Specialty:  Home Health Services    Contact information:    [address] [facility] [address]  [phone]        Follow up with Jc Alston MD .    Specialty:  Internal Medicine    Why:  1 week    Contact information:    [address] [place] [address]  [phone]          Discharge Order     Start     Ordered    07/21/17 1024  Discharge patient  Once     Expected Discharge Date:  07/21/17    Discharge Disposition:  Home or Self Care         07/21/17 1024            Total time spent discharging patient including evaluation,post hospitalization follow up,  medication and post hospitalization instructions and education total time exceeds 30 minutes.    Signed:  Jarocho Bass MD  7/21/2017  11:49 AM    EMR Dragon/Transcription disclaimer:   Much of this encounter note is an electronic transcription/translation of spoken language to printed text. The electronic translation of spoken language may permit erroneous, or at times, nonsensical words or phrases to be inadvertently transcribed; Although I have reviewed the note for such errors, some may still exist.        Electronically signed by Jarocho Bass MD at 7/21/2017 11:49 AM

## 2017-08-14 ENCOUNTER — TELEPHONE (OUTPATIENT)
Dept: NEUROSURGERY | Facility: CLINIC | Age: 62
End: 2017-08-14

## 2017-08-14 NOTE — TELEPHONE ENCOUNTER
Yes, it is okay to wear it 2 and 2 hours daily (divide it up) - goal is to have 4 hours daily. Patient informed.

## 2017-08-14 NOTE — TELEPHONE ENCOUNTER
"Pt wants to know if she can begin wearing her bone growth stimulator \"half time\", 2 hours at a time instead of 4. She states she did not wear it Sunday night because of headache and nausea  And intends to not wear it tonight because of the same issue.  Please call Pt to advise [phone]  "

## 2017-08-24 ENCOUNTER — HOSPITAL ENCOUNTER (OUTPATIENT)
Dept: GENERAL RADIOLOGY | Facility: HOSPITAL | Age: 62
Discharge: HOME | End: 2017-08-24
Admitting: NURSE PRACTITIONER

## 2017-08-24 ENCOUNTER — HOSPITAL ENCOUNTER (OUTPATIENT)
Dept: GENERAL RADIOLOGY | Facility: HOSPITAL | Age: 62
Discharge: HOME | End: 2017-08-24

## 2017-08-24 ENCOUNTER — OFFICE VISIT (OUTPATIENT)
Dept: NEUROSURGERY | Facility: CLINIC | Age: 62
End: 2017-08-24

## 2017-08-24 VITALS
BODY MASS INDEX: 33.86 KG/M2 | WEIGHT: 184 LBS | HEIGHT: 62 IN | HEART RATE: 72 BPM | RESPIRATION RATE: 16 BRPM | SYSTOLIC BLOOD PRESSURE: 152 MMHG | DIASTOLIC BLOOD PRESSURE: 90 MMHG

## 2017-08-24 DIAGNOSIS — Z09 POSTOP CHECK: ICD-10-CM

## 2017-08-24 DIAGNOSIS — M54.2 NECK PAIN OF OVER 3 MONTHS DURATION: Primary | ICD-10-CM

## 2017-08-24 PROBLEM — Z98.890 POST-OPERATIVE STATE: Status: ACTIVE | Noted: 2017-08-24

## 2017-08-24 PROCEDURE — 72040 X-RAY EXAM NECK SPINE 2-3 VW: CPT

## 2017-08-24 PROCEDURE — 72072 X-RAY EXAM THORAC SPINE 3VWS: CPT

## 2017-08-24 PROCEDURE — 99213 OFFICE O/P EST LOW 20 MIN: CPT | Performed by: NURSE PRACTITIONER

## 2017-08-24 NOTE — PROGRESS NOTES
"Subjective   Patient ID: Alannah Delgado is a 62 y.o. female is here today for follow-up for neck pain. Patient presents unaccompanied.     History of Present Illness    She returns the office today for ongoing follow-up status post C6 corpectomy with anterior instrumentation C4, 5, 7 T1 and posterior cervical decompression and removal of cervical posterior hardware with instrumented fusion from C2 through T3.  She also had a halo placed on the same day following surgery May 3, 2017.  She is undergone repeat cervical x-ray imaging prior to today's visit.    She denies any neck pain but reports this specific area of muscular discomfort in the left upper back.  He has pinpoint location and does not radiate.  She maintains in a cervical hard collar which she has worn since the halo was removed approximately 6 weeks ago.  She is hopeful to be discontinued from the hard collar soon.  At her last office visit, she was admitted to the hospital with an acute lower extremity DVT.  She is now on Coumadin and likely will be on this medication for at least 3 months.  She states that she is tolerating it well.    She denies any new issues or problems since her last office visit from our standpoint.    She presents unaccompanied.    /90 (BP Location: Right arm, Patient Position: Sitting)  Pulse 72  Resp 16  Ht 62\" (157.5 cm)  Wt 184 lb (83.5 kg)  BMI 33.65 kg/m2      The following portions of the patient's history were reviewed and updated as appropriate: allergies, current medications, past family history, past medical history, past social history, past surgical history and problem list.    Review of Systems   Musculoskeletal: Positive for neck pain (upper back pain). Negative for neck stiffness.   Neurological: Negative for weakness and numbness.   Psychiatric/Behavioral: Positive for sleep disturbance.       Objective   Physical Exam   Constitutional: She appears well-nourished. She is cooperative.  Non-toxic " appearance. She does not have a sickly appearance. She does not appear ill.   HENT:   Head: Atraumatic.   Eyes: EOM are normal.   Neck: Neck supple.   Wearing a well fitting cervical hard collar   Pulmonary/Chest: Breath sounds normal. No respiratory distress. She has no wheezes.   Abdominal: Soft.   Musculoskeletal: She exhibits edema (1+ bilateral lower extremity edema) and tenderness (Left upper back tenderness to mild palpation).   Strength equal bilateral upper and lower extremities   Neurological: She is alert. She has normal strength and normal reflexes. She displays no atrophy, no tremor and normal reflexes. No cranial nerve deficit or sensory deficit. She exhibits normal muscle tone. Coordination and gait normal. GCS eye subscore is 4. GCS verbal subscore is 5. GCS motor subscore is 6.   Stable gait and station, able to heel and toe walk, able to tandem  Normal sensory examination   Skin: Skin is warm and dry.   Psychiatric: She has a normal mood and affect. Her behavior is normal. Judgment and thought content normal.   Vitals reviewed.    Neurologic Exam     Cranial Nerves     CN III, IV, VI   Extraocular motions are normal.     Motor Exam     Strength   Strength 5/5 throughout.       Assessment/Plan   Independent Review of Radiographic Studies:    Cervical and thoracic x-ray imaging obtained today reveals multilevel anterior and posterior cervical thoracic fusion with posterior rods.  There is no evidence for loosening or hardware failure        Medical Decision Making:    She returns the office today for ongoing follow-up status post above-noted procedure with Dr. Guidry in May 2017.  Exam as noted above, no red flags.  She's undergone repeat cervical and thoracic x-rays which reveal stable findings and intact surgical hardware.  She is feeling and doing well and is hopeful to get the cervical hard collar off soon.  I will review her new x-rays with Dr. Guidry and she will be contacted with regard to how  long she is to remain in a collar.  We will have her return the office in one month with repeat cervical imaging.    She is doing and feeling very well.    Plan: Return to office in one month with cervical and thoracic x-ray imaging, she will be called with regard to discontinuation of the hard collar      Alannah was seen today for neck pain.    Diagnoses and all orders for this visit:    Neck pain of over 3 months duration  -     XR Spine Cervical 2 View; Future      Return in about 4 weeks (around 9/21/2017).

## 2017-08-25 ENCOUNTER — TELEPHONE (OUTPATIENT)
Dept: NEUROSURGERY | Facility: CLINIC | Age: 62
End: 2017-08-25

## 2017-08-25 NOTE — TELEPHONE ENCOUNTER
Patient was seen yesterday by Lilian for neck pain. Pt called wanting to know why she had not heard back from Lilian regarding her collar. Patient wants to know Explained to her that Dr Guidry was not in the office yesterday, but is today and that they are with patients now. She said that she wanted to let Lilian know that she did call back today.

## 2017-08-25 NOTE — TELEPHONE ENCOUNTER
----- Message from MAICO Olivera sent at 8/25/2017  4:14 PM EDT -----  After talking to Dr. Guidry, he wants her to remain in the cervical hard collar for 2 more months.  We will continue to see her back in one month with cervical imaging to ensure ongoing healing is taking place.  Please call and let her know, thanks!

## 2017-08-25 NOTE — TELEPHONE ENCOUNTER
Spoke with patient, given the information about staying in the hard collar for an additional two months as well as keeping the one month follow up with imaging.  Patient voiced understanding.

## 2017-09-20 DIAGNOSIS — Z98.890 POST-OPERATIVE STATE: Primary | ICD-10-CM

## 2017-09-29 ENCOUNTER — APPOINTMENT (OUTPATIENT)
Dept: GENERAL RADIOLOGY | Facility: HOSPITAL | Age: 62
End: 2017-09-29

## 2017-09-29 ENCOUNTER — HOSPITAL ENCOUNTER (OUTPATIENT)
Dept: GENERAL RADIOLOGY | Facility: HOSPITAL | Age: 62
Discharge: HOME | End: 2017-09-29
Admitting: NURSE PRACTITIONER

## 2017-09-29 ENCOUNTER — OFFICE VISIT (OUTPATIENT)
Dept: NEUROSURGERY | Facility: CLINIC | Age: 62
End: 2017-09-29

## 2017-09-29 VITALS
RESPIRATION RATE: 18 BRPM | WEIGHT: 188 LBS | HEIGHT: 62 IN | SYSTOLIC BLOOD PRESSURE: 132 MMHG | DIASTOLIC BLOOD PRESSURE: 84 MMHG | BODY MASS INDEX: 34.6 KG/M2 | HEART RATE: 96 BPM

## 2017-09-29 DIAGNOSIS — Z98.890 POST-OPERATIVE STATE: ICD-10-CM

## 2017-09-29 DIAGNOSIS — M43.22 CERVICAL VERTEBRAL FUSION: Primary | ICD-10-CM

## 2017-09-29 DIAGNOSIS — M20.039: ICD-10-CM

## 2017-09-29 PROCEDURE — 72072 X-RAY EXAM THORAC SPINE 3VWS: CPT

## 2017-09-29 PROCEDURE — 99213 OFFICE O/P EST LOW 20 MIN: CPT | Performed by: NURSE PRACTITIONER

## 2017-09-29 RX ORDER — CLINDAMYCIN PHOSPHATE 10 UG/ML
LOTION TOPICAL 2 TIMES DAILY
COMMUNITY
End: 2018-01-02

## 2017-09-29 NOTE — PROGRESS NOTES
Subjective   Patient ID: Alannah Delgado is a 62 y.o. female is here today for follow-up neck pain. Patient presents unaccompanied. BGS compliance 37.2%.    History of Present Illness   Patient presents for ongoing follow-up of neck pain and hardware failure with swan-neck deformity.  She is status post C6 corpectomy with anterior instrumentation at C4, 5, 7, T1 and posterior decompression and removal of hardware with new instrumentation placed from C2 to T3.  She was in a halo vest for 6 weeks.  She has remained in a hard collar since halo removal in June.  Since her last office visit she has had repeat thoracic x-rays.  She denies any neck pain or numbness tingling weakness of her extremities.  She continues in the hard collar.  She states that she is doing her best to be compliant with the bone growth stimulator.  Today's check shows 51 out of 137 days completed equaling 37.2% compliance.  She complains of a rash on her chin from the collar slipping over her chin at night.    The following portions of the patient's history were reviewed and updated as appropriate: allergies, current medications and problem list.    Review of Systems   Musculoskeletal: Positive for arthralgias (shoulder pain, left ) and neck stiffness. Negative for neck pain.   Skin: Positive for rash (rash on chin).   Neurological: Negative for weakness and numbness.   Psychiatric/Behavioral: Negative for sleep disturbance.       Objective   Physical Exam   Constitutional: She is oriented to person, place, and time. She appears well-developed and well-nourished.   HENT:   Rash on chin   Neck:   [place] collar in place.  No skin breakdown underlying collar; well healed right anterior cervical incision.  Well-healed midline cervical thoracic incision.  Palpable hardware at midpoint incision right and left side   Pulmonary/Chest: Effort normal.   Neurological: She is alert and oriented to person, place, and time. She has normal strength. Gait  normal.   Skin: Skin is warm and dry.   Psychiatric: She has a normal mood and affect. Her behavior is normal.   Vitals reviewed.    Neurologic Exam     Mental Status   Oriented to person, place, and time.   Attention: normal. Concentration: normal.   Level of consciousness: alert  Normal comprehension.     Motor Exam   Muscle bulk: normal    Strength   Strength 5/5 throughout.     Sensory Exam   Right arm light touch: normal  Left arm light touch: normal    Gait, Coordination, and Reflexes     Gait  Gait: normal      Assessment/Plan   Independent Review of Radiographic Studies:    Thoracic x-rays from [facility] dated September 29, 2017 also include views through the cervical spine.  No changes in hardware placement.  There may be a slight increase in right upper mid thoracic dextroscoliosis.    Medical Decision Making:    Patient presents for ongoing follow-up after multilevel anterior posterior cervical thoracic fusion.  She is doing well.  She is tolerating her brace.  She is now wearing her bone growth stimulator.     Her exam is as noted above with no neurologic red flags.  Her wounds are well-healed.  She continues to hard cervical collar.  Her brace is a little too low today.  I repositioned it and explained the importance of the proper position.  X-rays show no hardware concerns.  Patient needs a CT scan of the cervical and thoracic spine to evaluate for fusion prior to collar removal.  She will not need flexion-extension studies that she will have very little movement secondary to multilevel fusion.  She will return to office after her CT scan to determine collar removal.  She will likely need a bit of physical therapy to help with her lateral rotation.     Plan: CT cervical and thoracic spine including sagittal coronal reconsult and return to office following.  Continue in hard collar at this time    Alannah was seen today for neck pain.    Diagnoses and all orders for this  visit:    Cervical vertebral fusion  -     CT Cervical Spine Without Contrast; Future  -     CT Thoracic Spine Without Contrast; Future    Franklin-neck deformity, acquired, unspecified laterality  -     CT Cervical Spine Without Contrast; Future  -     CT Thoracic Spine Without Contrast; Future      Return for Follow-up with Dr. Guidry, with imaging.

## 2017-11-14 ENCOUNTER — OFFICE VISIT (OUTPATIENT)
Dept: NEUROSURGERY | Facility: CLINIC | Age: 62
End: 2017-11-14

## 2017-11-14 ENCOUNTER — HOSPITAL ENCOUNTER (OUTPATIENT)
Dept: CT IMAGING | Facility: HOSPITAL | Age: 62
Discharge: HOME | End: 2017-11-14
Admitting: NURSE PRACTITIONER

## 2017-11-14 ENCOUNTER — HOSPITAL ENCOUNTER (OUTPATIENT)
Dept: GENERAL RADIOLOGY | Facility: HOSPITAL | Age: 62
Discharge: HOME | End: 2017-11-14
Attending: NEUROLOGICAL SURGERY

## 2017-11-14 VITALS
BODY MASS INDEX: 34.93 KG/M2 | HEART RATE: 84 BPM | SYSTOLIC BLOOD PRESSURE: 168 MMHG | WEIGHT: 191 LBS | DIASTOLIC BLOOD PRESSURE: 80 MMHG | RESPIRATION RATE: 16 BRPM

## 2017-11-14 DIAGNOSIS — M20.039: ICD-10-CM

## 2017-11-14 DIAGNOSIS — T84.216A HARDWARE FAILURE OF ANTERIOR COLUMN OF SPINE (HCC): ICD-10-CM

## 2017-11-14 DIAGNOSIS — M43.22 CERVICAL VERTEBRAL FUSION: ICD-10-CM

## 2017-11-14 DIAGNOSIS — M43.22 CERVICAL VERTEBRAL FUSION: Primary | ICD-10-CM

## 2017-11-14 PROCEDURE — 99213 OFFICE O/P EST LOW 20 MIN: CPT | Performed by: NEUROLOGICAL SURGERY

## 2017-11-14 PROCEDURE — 72125 CT NECK SPINE W/O DYE: CPT

## 2017-11-14 PROCEDURE — 72128 CT CHEST SPINE W/O DYE: CPT

## 2017-11-14 PROCEDURE — 72052 X-RAY EXAM NECK SPINE 6/>VWS: CPT

## 2017-11-14 NOTE — PROGRESS NOTES
"Subjective   Patient ID: Alannah Delgado is a 62 y.o. female is here today for follow-up of neck pain with new imaging, wearing hard collar and using BGS. She is accompanied by her friend, Tigist.    History of Present Illness    She returns to the office today for ongoing follow-up of neck pain status post C6 corpectomy with anterior instrumentation C4, 5, 7, T1 and posterior decompression and removal of hardware with instrumentation placed from C2 to T3.  She has been in a cervical hard collar since she was taken out of her halo vest 6 weeks postop.  She reports she wears the hard collar \"most of the time\" and has undergone repeat x-rays for continued postoperative surveillance.    She reports ongoing pain with numbness in the back of her neck as a result of wearing the hard collar.  She denies any issues with walking and balance.  She also denies any falls.  Overall, she reports that she is feeling well.  She remains on Coumadin for recent history of DVT.    She presents accompanied with a friend.      /80 (BP Location: Left arm, Patient Position: Sitting, Cuff Size: Large Adult)  Pulse 84  Resp 16  Wt 191 lb (86.6 kg)  BMI 34.93 kg/m2      The following portions of the patient's history were reviewed and updated as appropriate: allergies, current medications, past family history, past medical history, past social history, past surgical history and problem list.    Review of Systems   Musculoskeletal: Positive for neck pain (and parascapular region). Negative for gait problem.   Neurological: Negative for weakness and numbness.       Objective   Physical Exam   Constitutional: She is oriented to person, place, and time. She appears well-developed and well-nourished. She is cooperative.  Non-toxic appearance. She does not have a sickly appearance. She does not appear ill.   Pleasant, middle-aged female   HENT:   Head: Normocephalic and atraumatic.   Eyes: EOM are normal.   Neck: Neck supple. No tracheal " deviation present.   Pulmonary/Chest: Effort normal and breath sounds normal.   Abdominal: Soft.   Musculoskeletal: She exhibits tenderness (Posterior neck tenderness). She exhibits no edema (Without bilateral lower extremity edema).        Cervical back: She exhibits decreased range of motion, tenderness and pain. She exhibits no bony tenderness, no swelling, no edema, no deformity and no laceration.   Strength equal bilateral upper extremities  Wearing well fitting cervical hard collar   Neurological: She is alert and oriented to person, place, and time. She has normal strength. She displays no atrophy, no tremor and normal reflexes. No cranial nerve deficit or sensory deficit. She exhibits normal muscle tone. Coordination normal. GCS eye subscore is 4. GCS verbal subscore is 5. GCS motor subscore is 6.   Stable, upright gait, normal station   Skin: Skin is warm and dry.   Psychiatric: She has a normal mood and affect. Her behavior is normal. Judgment and thought content normal.   Vitals reviewed.    Neurologic Exam     Mental Status   Oriented to person, place, and time.     Cranial Nerves     CN III, IV, VI   Extraocular motions are normal.     Motor Exam     Strength   Strength 5/5 throughout.       Assessment/Plan   Independent Review of Radiographic Studies:    Flexion extension x-rays show no loosening of the hardware and satisfactory fusion in the lateral gutters posteriorly.  The CT of the cervical spine also demonstrates fusion across the anterior construct as well as posterior fusion in the lateral gutters.  There is no loosening of hardware.  Medical Decision Making:    I confirmed and obtained the above history as recorded by the nurse practitioner acting as a scribe. I performed the above examination and it is documented by the nurse practitioner acting as a scribe.    The patient returns the office doing remarkably well clinically as noted above.  She is anxious to remove her hard collar.  From my  standpoint she can come out of her hard collar.  We anticipate seeing her in 6 weeks with repeat x-rays of the cervical spine to confirm that everything looks good.    Alannah was seen today for neck pain.    Diagnoses and all orders for this visit:    Cervical vertebral fusion  -     XR Spine Cervical 2 View; Future      Return in about 6 years (around 11/14/2023) for Follow up after testing, Follow up with nurse practitioner.

## 2018-01-02 ENCOUNTER — HOSPITAL ENCOUNTER (OUTPATIENT)
Dept: GENERAL RADIOLOGY | Facility: HOSPITAL | Age: 63
Discharge: HOME | End: 2018-01-02
Attending: NEUROLOGICAL SURGERY | Admitting: NEUROLOGICAL SURGERY

## 2018-01-02 ENCOUNTER — OFFICE VISIT (OUTPATIENT)
Dept: NEUROSURGERY | Facility: CLINIC | Age: 63
End: 2018-01-02

## 2018-01-02 VITALS
WEIGHT: 197 LBS | SYSTOLIC BLOOD PRESSURE: 152 MMHG | RESPIRATION RATE: 16 BRPM | BODY MASS INDEX: 36.03 KG/M2 | DIASTOLIC BLOOD PRESSURE: 80 MMHG | HEART RATE: 80 BPM

## 2018-01-02 DIAGNOSIS — E66.9 OBESITY (BMI 30-39.9): ICD-10-CM

## 2018-01-02 DIAGNOSIS — M43.22 CERVICAL VERTEBRAL FUSION: ICD-10-CM

## 2018-01-02 DIAGNOSIS — M43.22 CERVICAL VERTEBRAL FUSION: Primary | ICD-10-CM

## 2018-01-02 PROBLEM — Z98.890 POST-OPERATIVE STATE: Status: RESOLVED | Noted: 2017-08-24 | Resolved: 2018-01-02

## 2018-01-02 PROCEDURE — 72040 X-RAY EXAM NECK SPINE 2-3 VW: CPT

## 2018-01-02 PROCEDURE — 99213 OFFICE O/P EST LOW 20 MIN: CPT | Performed by: NEUROLOGICAL SURGERY

## 2018-01-02 NOTE — PROGRESS NOTES
"Subjective   Patient ID: Alannah Delgado is a 62 y.o. female is here today for follow-up of cervical fusion - BGS shows only 50% compliance. She has undergone repeat imaging and presents with her friend Malu.    History of Present Illness     The patient returns to the office today having bent over in order to adjust a exercise bike and heard a pop in her low back and developed some very severe pain.  The patient is improved though it remains somewhat sore.  She denies any pain into her legs.  She denies significant neck pain.  From the preoperative status or neck pain is improved.  She remains with a sore neck of course which is expected.  She is also noted a \"bump\" on the back of her neck and had some questions with regard to that.    The following portions of the patient's history were reviewed and updated as appropriate: allergies, current medications, past family history, past medical history, past social history, past surgical history and problem list.    Review of Systems   Musculoskeletal: Positive for back pain, neck pain and neck stiffness.   Neurological: Negative for weakness and numbness.       Objective   Physical Exam   Neck: Tracheal tenderness present.       Her some tenderness of the neck not much.  There is a area to the right of midline inferiorly with the protuberance which is nicely covered by skin.  The wound is well-healed.   Musculoskeletal:        Lumbar back: She exhibits decreased range of motion and tenderness.        Back:      Neurologic Exam    Assessment/Plan   Independent Review of Radiographic Studies:    I personally reviewed plain x-rays of the cervical spine and compared them to multiple the operative and postoperative x-rays.  There is no indication of cervical instability.  There is bony fusion that is ongoing.  Instrumentation is in good position.    Medical Decision Making:    The patient returns the office with an episode of low back pain.  This is about 5 days ago.  It " "has already started to improve.    The patient presents with the above-noted history and physical findings. There are no red flags.     I explained to the patient that \"preventative maintenance\" of the lumbar spine revolves around 3 specific lifestyle issues. This is related to diet, physical activity, and posture.     I suggested that dietary changes related to absolute maintenance of a ideal body weight significantly reduces stress on the lumbar spine and can reduce chronic low back pain. I also explained that certain foods are more inflammatory than other physicians and that there is growing evidence that keeping the micro-biome healthy with a diet that is low in simple carbohydrates and high in implant-based carbohydrates can reduce overall body inflammation and may reduce the development of osteoarthritis over time.     I recommended that she consider specific physical workouts that revolves around core strengthening in order to maintain healthy muscular support of the lumbar spine. I explained that the structure of are spine is similar to all animals but all other animals supports her spine on 4 legs and we, singular among all animals stand on our repair legs and thus we require quite a bit more core musculature to support our lumbar spine in healthful manner.    And finally I recommended a book called \"Eight Steps to a Pain Free Back\" which is written by Adeline Ramey and available on [employer] for less than the price of a single physical therapy co-pay. This book outlines this injury that posture plays a very specific role in the development of the western civilizations epidemic of spine disease. It compares third world economy's and our first world relatively physically less challenging lifestyle. It points out that the postural habits of people in the third world are ingrained and that they suffer from minimal complaints with regard to back discomfort or neck discomfort despite the substantial increase in " physical labor that they perform on a daily basis. Most importantly this book outlines an exercise program to strengthen core musculature and other muscles of posture both from the spine in the lumbar region and the neck. It has worked for me and may other pateints who have tried the exercise program.    I have given her a goal of weight loss of 17-18 pounds 3 months and to walk between 45 and 60 minutes on a daily basis by the time she returns to the shin I agree that if she fails to reach these goals are really to be no point in her returning to the office.    Alannah was seen today for cervical vertebral fusion.    Diagnoses and all orders for this visit:    Cervical vertebral fusion    Obesity (BMI 30-39.9)      Return in about 3 months (around 4/2/2018) for Follow up with nurse practitioner.

## 2018-03-28 ENCOUNTER — OFFICE VISIT (OUTPATIENT)
Dept: NEUROSURGERY | Facility: CLINIC | Age: 63
End: 2018-03-28

## 2018-03-28 ENCOUNTER — HOSPITAL ENCOUNTER (OUTPATIENT)
Dept: GENERAL RADIOLOGY | Facility: HOSPITAL | Age: 63
Discharge: HOME | End: 2018-03-28

## 2018-03-28 ENCOUNTER — HOSPITAL ENCOUNTER (OUTPATIENT)
Dept: CT IMAGING | Facility: HOSPITAL | Age: 63
Discharge: HOME | End: 2018-03-28
Admitting: NURSE PRACTITIONER

## 2018-03-28 VITALS
BODY MASS INDEX: 33.13 KG/M2 | SYSTOLIC BLOOD PRESSURE: 120 MMHG | WEIGHT: 180 LBS | DIASTOLIC BLOOD PRESSURE: 90 MMHG | HEART RATE: 78 BPM | HEIGHT: 62 IN

## 2018-03-28 DIAGNOSIS — M54.2 NECK PAIN: ICD-10-CM
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
## 2018-03-28 DIAGNOSIS — M54.2 NECK PAIN: Primary | ICD-10-CM

## 2018-03-28 DIAGNOSIS — M43.22 CERVICAL VERTEBRAL FUSION: ICD-10-CM

## 2018-03-28 DIAGNOSIS — Z79.01 ANTICOAGULATED: ICD-10-CM

## 2018-03-28 DIAGNOSIS — M54.6 PAIN IN THORACIC SPINE: ICD-10-CM

## 2018-03-28 PROCEDURE — 72050 X-RAY EXAM NECK SPINE 4/5VWS: CPT

## 2018-03-28 PROCEDURE — 99214 OFFICE O/P EST MOD 30 MIN: CPT | Performed by: NURSE PRACTITIONER

## 2018-03-28 PROCEDURE — 70450 CT HEAD/BRAIN W/O DYE: CPT

## 2018-03-28 PROCEDURE — 72072 X-RAY EXAM THORAC SPINE 3VWS: CPT

## 2018-03-28 NOTE — PROGRESS NOTES
Subjective   Patient ID: Alannah Delgado is a 63 y.o. female who is here today for follow-up for neck pain. She is status post a cervical fusion in May 2017. She presents unaccompanied.     History of Present Illness   Patient presents for follow-up of low back and neck pain. Back pain is much better. Since her last office visit she has been working very hard on weight loss.  She has lost 17 pounds since her last visit. She has significantly reduced her carbohydrate intake and she has walked 1 hour every day. She fell 1-2 weeks ago slipping on a floor. She fell backward onto hard floor and hit her head and twisted her ankle. Her neck is not hurting in midline but she does have pain to the side of her spine. No arm pain. She denies any new headaches but she has chronic headaches. Her headaches are not worse since fall. Some dizziness but vision or speech changes or nausea. Dizziness lasts only a few seconds, no syncope.  She states that her PCP has had difficulty regulating her Coumadin level and is wanting to put her on one of the newer agent anticoagulants.  She is adverse to this.    The following portions of the patient's history were reviewed and updated as appropriate: allergies, current medications and problem list.    Review of Systems   Gastrointestinal: Negative for nausea.   Genitourinary: Negative for enuresis.   Musculoskeletal: Positive for back pain and neck pain. Negative for arthralgias.   Neurological: Positive for dizziness and headaches. Negative for weakness and numbness.       Objective   Physical Exam   Constitutional: She is oriented to person, place, and time. She appears well-developed and well-nourished.   Body mass index is 32.92 kg/m².     Musculoskeletal: She exhibits no edema.        Cervical back: She exhibits decreased range of motion (due to fusion) and tenderness (paraspinous). She exhibits no bony tenderness.        Thoracic back: She exhibits tenderness (paraspinous).    Neurological: She is alert and oriented to person, place, and time. She has a normal Finger-Nose-Finger Test and a normal Romberg Test. Gait normal.   Skin: Skin is warm and dry.   Psychiatric: She has a normal mood and affect. Her behavior is normal. Judgment normal.   Vitals reviewed.    Neurologic Exam     Mental Status   Oriented to person, place, and time.   Level of consciousness: alert  Knowledge: good.   Normal comprehension.     Cranial Nerves   Cranial nerves II through XII intact.     Motor Exam   Right arm pronator drift: absent  Left arm pronator drift: absent    Strength   Strength 5/5 except as noted. Bilateral deltoid giveaway weakness secondary to neck pain     Sensory Exam   Light touch normal.     Gait, Coordination, and Reflexes     Gait  Gait: normal    Coordination   Romberg: negative  Finger to nose coordination: normal    Reflexes   Right Temple: absent  Left Temple: absent      Assessment/Plan   Independent Review of Radiographic Studies:    No new imaging    Medical Decision Making:    Patient presents for ongoing follow-up status post multilevel posterior cervical fusion secondary to prior hardware failure.  At her last office visit she had some low back pain.  This has resolved.  She has done exceedingly well working on weight loss is less than 17 pounds since her last visit.  She reports improvement in bone growth stimulator use.  This readings currently show 193 out of 319 days.  She has now had 6 months of use but only 60%.  She can stop using it now.  She reports chronic headaches no new headaches after a fall a personally one to 2 weeks ago, but she has quite a bit of paraspinous pain.  No new radicular symptoms or imbalance.    Her exam is as noted above with no neurologic red flags.  She has paraspinous tenderness.  Incisions are well-healed.  Cranial nerves are all intact.  Although I feel like this is probably muscular in nature, going to order cervical and thoracic x-rays as  well as a stat CT scan of the head secondary to the fall hitting her head on Coumadin.  If these are all okay, then she may go home and use heat for her discomfort.  I do not think that outside of some gentle shoulder range of motion exercises that anything else will need to be done.  Unless something is ominous on the x-rays, we will see her back on a when necessary basis.  She should continue to work on weight loss.    Plan: Stat CT scan of the head.  Holding call.  Cervical and thoracic x-rays.  Patient to follow-up if there are any abnormalities that are new on the x-rays otherwise when necessary status.    Alannah was seen today for neck pain.    Diagnoses and all orders for this visit:    Neck pain  -     XR spine cervical 3 vw; Future    Pain in thoracic spine  -     XR spine thoracic 3 vw; Future    Fall, initial encounter  -     XR spine cervical 3 vw; Future  -     CT Head Without Contrast; Future    Cervical vertebral fusion  -     XR spine cervical 3 vw; Future    Anticoagulated  -     CT Head Without Contrast; Future      Return if symptoms worsen or fail to improve.

## 2018-03-28 NOTE — NURSING NOTE
Dr. Hamilton called and said patient may go home. Patient finishing Xrays. Will tell patient when she comes out.

## 2018-03-29 ENCOUNTER — TELEPHONE (OUTPATIENT)
Dept: NEUROSURGERY | Facility: CLINIC | Age: 63
End: 2018-03-29

## 2021-05-22 NOTE — TELEPHONE ENCOUNTER
I called the patients home and cell number and LVM on cell to return my call to advise.  
Patient advised. She asks if she can take aleve for a headache. I s/w MAICO Bledsoe and she advised that it is okay to take Aleve but no more than is directed. Lilian also advises that she should also discuss with PCP. She voiced understanding.  
Please let her know that all of her imaging is stable. If the CT head had shown any issue, she wouldn't have been allowed to go home. Thanks!  
Pt called in concerned about her results of stat holding CT/Xray performed yesterday afternoon after her appointment with KK.  She requests to reach her at [phone].      
ankle pain/injury

## 2023-04-10 NOTE — PROGRESS NOTES
[facility] PROGRESS NOTE    PATIENT IDENTIFICATION:   Name:  Alannah Delgado      MRN:  [id number]     62 y.o.  female               CC:hx of anterior & posterior cervical decompression on 5/3 s/p HALO with acute R LE DVTs       Subjective     Interval History: has complaints of R LE tenderness and swelling, minimal HA.    Objective     Vital signs in last 24 hours:  Temp:  [97.8 °F (36.6 °C)-98.9 °F (37.2 °C)] 98 °F (36.7 °C)  Heart Rate:  [81-93] 91  Resp:  [16] 16  BP: (120-137)/(77-84) 128/81  ICP ranges-    Intake/Output last 3 shifts:  I/O last 3 completed shifts:  In: -   Out: 75 [Urine:75]    Intake/Output this shift:  I/O this shift:  In: 1140 [P.O.:1140]  Out: -       Physical Exam:  General:   Awake, alert, and oriented x 3. NAD  Severely edematous RLE with tenderness and erythema to palpation  CN III IV VI: Extraocular movements are full , PERRL   CN V: Normal facial sensation and strength of muscles of mastication.  CN VII: Facial movements are symmetric. No weakness.  Wearing well fitting cervical hard collar    Motor: Normal muscle strength, bulk and tone in upper and lower extremities.  No fasciculations, rigidity, spasticity, or abnormal movements.  Reflexes: 2+ in the upper and lower extremities. Plantar responses are flexor.  Coordination: Finger to nose test shows no dysmetria.  Rapid alternating movements are normal.  Heel to shin normal.  Extremities:  Patient is wearing AMMY and SCD bilaterally    LABS:    Lab Results (last 24 hours)     Procedure Component Value Units Date/Time    aPTT [[id number]]  (Normal) Collected:  07/17/17 1938    Specimen:  Blood Updated:  07/17/17 2040     PTT 28.8 seconds     CBC & Differential [[id number]] Collected:  07/17/17 1938    Specimen:  Blood Updated:  07/17/17 2009    Narrative:       The following orders were created for panel order CBC & Differential.  Procedure                               Abnormality         Status                      ---------                               -----------         ------                     CBC Auto Differential[[id number]]        Abnormal            Final result                 Please view results for these tests on the individual orders.    Protime-INR [[id number]]  (Abnormal) Collected:  07/17/17 1938    Specimen:  Blood Updated:  07/17/17 2040     Protime 14.0 Seconds      INR 1.12 (H)    CBC Auto Differential [[id number]]  (Abnormal) Collected:  07/17/17 1938    Specimen:  Blood Updated:  07/17/17 2009     WBC 8.84 10*3/mm3      RBC 4.15 10*6/mm3      Hemoglobin 12.6 g/dL      Hematocrit 39.5 %      MCV 95.2 fL      MCH 30.4 pg      MCHC 31.9 (L) g/dL      RDW 14.4 (H) %      RDW-SD 49.2 fl      MPV 10.8 fL      Platelets 260 10*3/mm3      Neutrophil % 72.2 %      Lymphocyte % 14.3 (L) %      Monocyte % 11.4 %      Eosinophil % 1.7 %      Basophil % 0.2 %      Immature Grans % 0.2 %      Neutrophils, Absolute 6.38 10*3/mm3      Lymphocytes, Absolute 1.26 10*3/mm3      Monocytes, Absolute 1.01 10*3/mm3      Eosinophils, Absolute 0.15 10*3/mm3      Basophils, Absolute 0.02 10*3/mm3      Immature Grans, Absolute 0.02 10*3/mm3     aPTT [[id number]]  (Abnormal) Collected:  07/18/17 0312    Specimen:  Blood Updated:  07/18/17 0405     .0 (H) seconds     CBC & Differential [[id number]] Collected:  07/18/17 0312    Specimen:  Blood Updated:  07/18/17 0335    Narrative:       The following orders were created for panel order CBC & Differential.  Procedure                               Abnormality         Status                     ---------                               -----------         ------                     CBC Auto Differential[[id number]]        Abnormal            Final result                 Please view results for these tests on the individual orders.    Protime-INR [[id number]]  (Abnormal) Collected:  07/18/17 0312    Specimen:  Blood Updated:  07/18/17 0405     Protime 14.3 (H) Seconds      INR 1.15  (H)    CBC Auto Differential [[id number]]  (Abnormal) Collected:  07/18/17 0312    Specimen:  Blood Updated:  07/18/17 0335     WBC 7.84 10*3/mm3      RBC 3.97 10*6/mm3      Hemoglobin 12.0 g/dL      Hematocrit 37.7 %      MCV 95.0 fL      MCH 30.2 pg      MCHC 31.8 (L) g/dL      RDW 14.3 (H) %      RDW-SD 49.3 fl      MPV 10.8 fL      Platelets 240 10*3/mm3      Neutrophil % 62.6 %      Lymphocyte % 21.8 %      Monocyte % 12.1 (H) %      Eosinophil % 3.2 %      Basophil % 0.3 %      Immature Grans % 0.0 %      Neutrophils, Absolute 4.91 10*3/mm3      Lymphocytes, Absolute 1.71 10*3/mm3      Monocytes, Absolute 0.95 10*3/mm3      Eosinophils, Absolute 0.25 10*3/mm3      Basophils, Absolute 0.02 10*3/mm3      Immature Grans, Absolute 0.00 10*3/mm3     Comprehensive Metabolic Panel [[id number]]  (Abnormal) Collected:  07/18/17 0313    Specimen:  Blood Updated:  07/18/17 0405     Glucose 102 (H) mg/dL      BUN 10 mg/dL      Creatinine 0.59 mg/dL      Sodium 138 mmol/L      Potassium 3.5 mmol/L      Chloride 101 mmol/L      CO2 21.4 (L) mmol/L      Calcium 9.2 mg/dL      Total Protein 6.9 g/dL      Albumin 3.70 g/dL      ALT (SGPT) 28 U/L      AST (SGOT) 22 U/L      Alkaline Phosphatase 75 U/L      Total Bilirubin 0.8 mg/dL      eGFR Non African Amer 104 mL/min/1.73      Globulin 3.2 gm/dL      A/G Ratio 1.2 g/dL      BUN/Creatinine Ratio 16.9     Anion Gap 15.6 mmol/L     aPTT [[id number]]  (Abnormal) Collected:  07/18/17 0926    Specimen:  Blood Updated:  07/18/17 1041     PTT 53.2 (H) seconds           IMAGING STUDIES:    No new imaging      Meds reviewed/changed: Yes    Current Facility-Administered Medications   Medication Dose Route Frequency Provider Last Rate Last Dose   • acetaminophen (TYLENOL) tablet 650 mg  650 mg Oral Q4H PRN Cehvy Macias MD   650 mg at 07/18/17 1351   • cyclobenzaprine (FLEXERIL) tablet 10 mg  10 mg Oral TID PRN Chevy Macias MD       • heparin (porcine) 5000 UNIT/ML injection 3,400-6,800  Units  40-80 Units/kg Intravenous Q6H PRN Chevy Macias MD   6,800 Units at 07/18/17 1219   • heparin [id number] units/250 mL (100 units/mL) in 0.45 % NaCl infusion  1,500 Units/hr Intravenous Titrated Chevy Macias MD 16.66 mL/hr at 07/18/17 1218 1,666 Units/hr at 07/18/17 1218   • sodium chloride 0.9 % flush 1-10 mL  1-10 mL Intravenous PRN Chevy Macias MD       • warfarin (COUMADIN) tablet 5 mg  5 mg Oral Daily Chevy Macias MD   5 mg at 07/17/17 2222       Assessment/Plan     ASSESSMENT:    Principal Problem:    Acute deep vein thrombosis (DVT) of femoral vein of right lower extremity  Active Problems:    C5 on 6 anterolisthesis    Cervical vertebral fusion    Patient is Worship    Acute DVT (deep venous thrombosis)    Acute deep vein thrombosis (DVT) of right femoral vein      PLAN: She was directly admitted from our office yesterday evening with symptoms of right lower extremity DVT after being seen for routine post op visit.  Stat duplex venous Doppler confirmed the DVTs and she was started on heparin following admission.  From a neurosurgical standpoint, there are no contraindications to anticoagulation therapy.  We recommend something that is reversible, such as Coumadin, if possible.  She continues to do well from our standpoint.  She is wearing cervical hard collar that is fitting well.  Postop cervical and thoracic x-ray imaging reveals stable findings as well.    I discussed the patients findings and my recommendations with patient, family, nursing staff and Dr Huerta       LOS: 0 days       MAICO Olivera  7/18/2017  4:56 PM         5-Fu Counseling: 5-Fluorouracil Counseling:  I discussed with the patient the risks of 5-fluorouracil including but not limited to erythema, scaling, itching, weeping, crusting, and pain.

## 2023-08-04 NOTE — PROGRESS NOTES
Patient ID: Alannah Delgado is a 68 y.o. female is being seen for consultation today at the request of Jc Reardon MD for neck pain that does not radiate.     Subjective     The patient is here in regards to   Chief Complaint   Patient presents with    Neck Pain       History of Present Illness  Alannah had surgery in 2016 with Dr. Young.  She did well for several years but started developing symptoms of cervicalgia and difficulty lifting her left arm up overhead sometimes which would result in pain in her shoulder and neck.  She also has persistent headaches as a result of neck pain and occasionally goes to the ED to have Toradol shots which are somewhat helpful.  These headaches are is described as very severe migraine-like headaches.  She does not see a neurologist for this issue.  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  She had a DEXA scan performed prior to surgery which showed severe osteoporosis.  I am not able to visualize her preoperative imaging but her CT scan in 2017 and 2023 are available to me.    While in the room and during my examination of the patient I wore a mask and eye protection.  I washed my hands before and after this patient encounter.  The patient was also wearing a mask.    The following portions of the patient's history were reviewed and updated as appropriate: allergies, current medications, past family history, past medical history, past social history, past surgical history and problem list.    Review of Systems   Respiratory:  Negative for chest tightness and shortness of breath.    Cardiovascular:  Negative for chest pain.   Musculoskeletal:  Positive for neck pain.      Past Medical History:   Diagnosis Date    Clotting disorder     History of radiation therapy     Limited joint range of motion     NECK    Migraine     Neck mass     benign   history of    Neck pain     Patient is Mosque        Allergies   Allergen Reactions    Penicillins       Possible convulsions, CHILDHOOD ALLERGY    Morphine And Related Nausea And Vomiting      And disorientation       Family History   Problem Relation Age of Onset    Heart disease Mother     Diabetes Father     Heart disease Father     Stroke Father     Stroke Sister     Cancer Brother        Social History     Socioeconomic History    Marital status:    Tobacco Use    Smoking status: Never    Smokeless tobacco: Never   Substance and Sexual Activity    Alcohol use: No    Drug use: No    Sexual activity: Defer       Past Surgical History:   Procedure Laterality Date    ANTERIOR CERVICAL DISCECTOMY W/ FUSION Right 5/3/2017    Procedure: C6 corpectomy and anterior instrumentation.  ;  Surgeon: Ha Guidry MD;  Location: [facility];  Service:     CERVICAL DISCECTOMY POSTERIOR FUSION WITH BRAIN LAB N/A 5/3/2017    Procedure: Posterior cervical decompression and removal of cervical posterior hardware.  Posterior cervical fusion C2 through T3-instrumented with mastergraft.  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    Physical Exam  Constitutional:       Appearance: Normal appearance.   HENT:      Head: Normocephalic and atraumatic.   Eyes:      Extraocular Movements: Extraocular movements intact.      Conjunctiva/sclera: Conjunctivae normal.      Pupils: Pupils are equal, round, and reactive to light.   Neck:      Comments: Significant muscular atrophy over the mid cervical spine with palpable hardware and lack of trapezius or splenius capitis muscles.  Cardiovascular:      Rate and  Rhythm: Normal rate and regular rhythm.      Pulses: Normal pulses.   Pulmonary:      Breath sounds: Normal breath sounds.   Abdominal:      Palpations: Abdomen is soft.   Musculoskeletal:         General: Normal range of motion.      Cervical back: Normal range of motion and neck supple.   Skin:     General: Skin is warm and dry.   Neurological:      Mental Status: She is alert and oriented to person, place, and time.      Cranial Nerves: Cranial nerves 2-12 are intact.      Motor: Motor function is intact. No weakness or atrophy.      Coordination: Coordination is intact. Romberg sign negative. Romberg Test normal.      Gait: Gait is intact. Gait normal.      Deep Tendon Reflexes: Reflexes are normal and symmetric.      Reflex Scores:       Tricep reflexes are 2+ on the right side and 2+ on the left side.       Bicep reflexes are 2+ on the right side and 2+ on the left side.       Brachioradialis reflexes are 2+ on the right side and 2+ on the left side.       Patellar reflexes are 2+ on the right side and 2+ on the left side.       Achilles reflexes are 2+ on the right side and 2+ on the left side.  Psychiatric:         Speech: Speech normal.       Neurologic Exam     Mental Status   Oriented to person, place, and time.   Attention: normal. Concentration: normal.   Speech: speech is normal   Level of consciousness: alert    Cranial Nerves   Cranial nerves II through XII intact.     CN III, IV, VI   Pupils are equal, round, and reactive to light.    Motor Exam   Muscle bulk: normal  Overall muscle tone: normal    Strength   Strength 5/5 except as noted.     Sensory Exam   Light touch normal.     Gait, Coordination, and Reflexes     Gait  Gait: normal    Coordination   Romberg: negative    Reflexes   Reflexes 2+ except as noted.   Right brachioradialis: 2+  Left brachioradialis: 2+  Right biceps: 2+  Left biceps: 2+  Right triceps: 2+  Left triceps: 2+  Right patellar: 2+  Left patellar: 2+  Right achilles:  2+  Left achilles: 2+    Assessment & Plan   Independent Review of Radiographic Studies:      I personally reviewed the images from the following studies.    CT: CT of the cervical spine was reviewed and shows C4-T1 anterior hardware with persistent kyphotic curvature and posterior C2-T3 instrumentation with solid posterior lateral fusion and cross-link.    Assessment/Plan: The majority of Alannah's symptoms come from the lack of muscular support of her neck after this procedure.  Her posterior spinal hardware is palpable directly under her skin and she has significant atrophy of her trapezius muscle which causes her pain when she moves her arms.  She also has pain when rotating her neck independently from her shoulders and I counseled her that that is to be expected after a significant fusion like this and that she should try to turn using her shoulders.  I do not think she is good candidate for any additional type of surgery in her neck.  She was already osteoporotic prior to surgery and now is even even worse candidate given her significant muscular atrophy which would lead to persistent wound healing issues.  I recommended that she talk to her PCP or pain specialist about occipital nerve blocks for her headaches.    Medical Decision Making:      Follow-up as needed         Diagnoses and all orders for this visit:    1. Cervical vertebral fusion (Primary)    2. 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

## 2023-08-09 ENCOUNTER — OFFICE VISIT (OUTPATIENT)
Dept: NEUROSURGERY | Facility: CLINIC | Age: 68
End: 2023-08-09
Payer: MEDICARE

## 2023-08-09 VITALS
DIASTOLIC BLOOD PRESSURE: 80 MMHG | SYSTOLIC BLOOD PRESSURE: 144 MMHG | HEIGHT: 62 IN | WEIGHT: 178 LBS | BODY MASS INDEX: 32.76 KG/M2

## 2023-08-09 DIAGNOSIS — M43.22 CERVICAL VERTEBRAL FUSION: Primary | ICD-10-CM

## 2023-08-09 DIAGNOSIS — M54.2 CERVICALGIA: ICD-10-CM

## 2023-08-09 DIAGNOSIS — M43.8X2 SWAN NECK DEFORMITY OF CERVICAL SPINE: ICD-10-CM

## 2023-08-09 RX ORDER — SUMATRIPTAN 50 MG/1
TABLET, FILM COATED ORAL
COMMUNITY
Start: 2023-05-01

## 2023-08-09 RX ORDER — AMLODIPINE BESYLATE 5 MG/1
1 TABLET ORAL DAILY
COMMUNITY
Start: 2023-07-11

## (undated) DEVICE — BNDG ELAS ELITE V/CLOSE 3IN 5YD LF STRL

## (undated) DEVICE — NDL SPINE 20G 1 1/2IN YEL LF

## (undated) DEVICE — DRP STRL STERILEZ ZFLD

## (undated) DEVICE — NDL HYPO PRECISIONGLIDE REG 25G 1 1/2

## (undated) DEVICE — PENCL E/S HNDSWCH ROCKR CB

## (undated) DEVICE — UNDYED BRAIDED (POLYGLACTIN 910), SYNTHETIC ABSORBABLE SUTURE: Brand: COATED VICRYL

## (undated) DEVICE — GOWN,PREVENTION PLUS,XXLARGE,STERILE: Brand: MEDLINE

## (undated) DEVICE — COLR CERV VISTA TX 1SZ ADJ

## (undated) DEVICE — JACKSON-PRATT 100CC BULB RESERVOIR: Brand: CARDINAL HEALTH

## (undated) DEVICE — 1010 S-DRAPE TOWEL DRAPE 10/BX: Brand: STERI-DRAPE™

## (undated) DEVICE — SUT VIC 0 CT1 36IN J946H

## (undated) DEVICE — GLV SURG BIOGEL LTX PF 7 1/2

## (undated) DEVICE — GLV SURG BIOGEL LTX PF 7

## (undated) DEVICE — SPHR MARKR STEALTH STATION

## (undated) DEVICE — DRN JP FLT NO TROC SIL FUL/PERF 7MM

## (undated) DEVICE — APPL CHLORAPREP W/TINT 10.5ML PERC STRL

## (undated) DEVICE — PK NEURO SPINE 40

## (undated) DEVICE — FLOSEAL MATRIX IS INDICATED IN SURGICAL PROCEDURES (OTHER THAN IN OPHTHALMIC) AS AN ADJUNCT TO HEMOSTASIS WHEN CONTROL OF BLEEDING BY LIGATURE OR CONVENTIONAL PROCEDURES IS INEFFECTIVE OR IMPRACTICAL.: Brand: FLOSEAL HEMOSTATIC MATRIX

## (undated) DEVICE — PIN DISTRACT TI 14MM STRL

## (undated) DEVICE — TOTAL TRAY, 16FR 10ML SIL FOLEY, URN: Brand: MEDLINE

## (undated) DEVICE — ANTIBACTERIAL UNDYED BRAIDED (POLYGLACTIN 910), SYNTHETIC ABSORBABLE SUTURE: Brand: COATED VICRYL

## (undated) DEVICE — ELECTRD BLD EDGE/INSUL1P 2.4X5.1MM STRL

## (undated) DEVICE — Device

## (undated) DEVICE — COL BONE ANSPACH

## (undated) DEVICE — IRRIGATOR BULB ASEPTO 60CC STRL

## (undated) DEVICE — TBG SXN PERFUS W TP

## (undated) DEVICE — TOWEL,OR,DSP,ST,BLUE,STD,4/PK,20PK/CS: Brand: MEDLINE

## (undated) DEVICE — 3M™ STERI-DRAPE™ INSTRUMENT POUCH 1018: Brand: STERI-DRAPE™

## (undated) DEVICE — BIT DRL VERTEX NAV 2.4MM

## (undated) DEVICE — STRIP CLS WND SUTURESTRIP/PLS 0.5X5IN TP1105

## (undated) DEVICE — BIOPATCH™ ANTIMICROBIAL DRESSING WITH CHLORHEXIDINE GLUCONATE IS A HYDROPHILLIC POLYURETHANE ABSORPTIVE FOAM WITH CHLORHEXIDINE GLUCONATE (CHG) WHICH INHIBITS BACTERIAL GROWTH UNDER THE DRESSING. THE DRESSING IS INTENDED TO BE USED TO ABSORB EXUDATE, COVER A WOUND CAUSED BY VASCULAR AND NONVASCULAR PERCUTANEOUS MEDICAL DEVICES DURING SURGERY, AS WELL AS REDUCE LOCAL INFECTION AND COLONIZATION OF MICROORGANISMS.: Brand: BIOPATCH

## (undated) DEVICE — DRP MICROSCP LEICA W/GLASS LENS

## (undated) DEVICE — SUT MNCRYL PLS ANTIB UD 4/0 PS2 18IN

## (undated) DEVICE — PK ATS CUST W CARDIOTOMY RESEVOIR

## (undated) DEVICE — DRP C/ARM 41X74IN

## (undated) DEVICE — SPNG LAP 18X18IN LF STRL PK/5

## (undated) DEVICE — STPLR SKIN VISISTAT WD 35CT

## (undated) DEVICE — NDL SPINE 20G 3 1/2 YEL STRL 1P/U

## (undated) DEVICE — BREMER CROWN & RING ACCESSORY SYSTEM W/HIFIX SMALL CROWN 19-23 IN. (48-58 CM): Brand: BREMER HIFIX

## (undated) DEVICE — SPNG GZ WOVN 4X4IN 12PLY 10/BX STRL

## (undated) DEVICE — MAGNETIC DRAPE: Brand: DEVON

## (undated) DEVICE — 3.0MM PRECISION NEURO (MATCH HEAD)

## (undated) DEVICE — DRSNG SURESITE WNDW 2.38X2.75

## (undated) DEVICE — LIMB HOLDER, WRIST/ANKLE: Brand: DEROYAL

## (undated) DEVICE — GLV SURG SENSICARE MICRO PF LF 8 STRL

## (undated) DEVICE — SUT SILK 2/0 TIES 18IN A185H

## (undated) DEVICE — CABLE 2344000 POWEREASE NIM: Brand: POWEREASE™ INSTRUMENTS

## (undated) DEVICE — DRSNG WND BORDR/ADHS NONADHR/GZ LF 4X14IN STRL

## (undated) DEVICE — CODMAN® SURGICAL PATTIES 3/4" X 3/4" (1.91CM X 1.91CM): Brand: CODMAN®

## (undated) DEVICE — DRAPE,CHEST,FENES,15X10,STERIL: Brand: MEDLINE

## (undated) DEVICE — DISPOSABLE 9450003 ELECTRO TWST PAIR 8CH

## (undated) DEVICE — ENCORE® LATEX ORTHO SIZE 8.5, STERILE LATEX POWDER-FREE SURGICAL GLOVE: Brand: ENCORE

## (undated) DEVICE — CONN TBG Y 5 IN 1 LF STRL

## (undated) DEVICE — SMOKE EVACUATION TUBING WITH 7/8 IN TO 1/4 IN REDUCER: Brand: BUFFALO FILTER

## (undated) DEVICE — PK UNIV PLSTC 40

## (undated) DEVICE — DISPOSABLE IRRIGATION BIPOLAR CORD, M1000 TYPE: Brand: KIRWAN

## (undated) DEVICE — NDL SPINE 18G 31/2IN PNK

## (undated) DEVICE — SCREW 6958932 4.5 X 32MM MAS
Type: IMPLANTABLE DEVICE | Status: NON-FUNCTIONAL
Brand: VERTEX® RECONSTRUCTION SYSTEM
Removed: 2017-05-03

## (undated) DEVICE — BANDAGE,GAUZE,BULKEE II,4.5"X4.1YD,STRL: Brand: MEDLINE

## (undated) DEVICE — ADHS SKIN DERMABOND TOP ADVANCED

## (undated) DEVICE — PROXIMATE RH ROTATING HEAD SKIN STAPLERS (35 WIDE) CONTAINS 35 STAINLESS STEEL STAPLES: Brand: PROXIMATE

## (undated) DEVICE — DRP SLUSH WARMR MACH 52X66IN OM-ORS-301

## (undated) DEVICE — SHEET, DRAPE, SPLIT, STERILE: Brand: MEDLINE